# Patient Record
Sex: FEMALE | Race: WHITE | Employment: FULL TIME | URBAN - METROPOLITAN AREA
[De-identification: names, ages, dates, MRNs, and addresses within clinical notes are randomized per-mention and may not be internally consistent; named-entity substitution may affect disease eponyms.]

---

## 2019-08-13 ENCOUNTER — APPOINTMENT (OUTPATIENT)
Dept: GENERAL RADIOLOGY | Age: 64
End: 2019-08-13
Attending: EMERGENCY MEDICINE
Payer: SELF-PAY

## 2019-08-13 ENCOUNTER — HOSPITAL ENCOUNTER (EMERGENCY)
Age: 64
Discharge: HOME | End: 2019-08-13
Attending: EMERGENCY MEDICINE
Payer: SELF-PAY

## 2019-08-13 VITALS
DIASTOLIC BLOOD PRESSURE: 85 MMHG | TEMPERATURE: 97.8 F | RESPIRATION RATE: 12 BRPM | BODY MASS INDEX: 26.68 KG/M2 | SYSTOLIC BLOOD PRESSURE: 147 MMHG | HEIGHT: 67 IN | HEART RATE: 89 BPM | WEIGHT: 170 LBS | OXYGEN SATURATION: 100 %

## 2019-08-13 DIAGNOSIS — S62.102A LEFT WRIST FRACTURE, CLOSED, INITIAL ENCOUNTER: Primary | ICD-10-CM

## 2019-08-13 PROCEDURE — 73110 X-RAY EXAM OF WRIST: CPT

## 2019-08-13 PROCEDURE — 99283 EMERGENCY DEPT VISIT LOW MDM: CPT

## 2019-08-13 PROCEDURE — 75810000053 HC SPLINT APPLICATION

## 2019-08-13 RX ORDER — HYDROCODONE BITARTRATE AND ACETAMINOPHEN 5; 325 MG/1; MG/1
1 TABLET ORAL
Qty: 8 TAB | Refills: 0 | Status: SHIPPED | OUTPATIENT
Start: 2019-08-13 | End: 2019-08-16

## 2019-08-13 NOTE — DISCHARGE INSTRUCTIONS
Patient Education        Broken Wrist: Care Instructions  Your Care Instructions    Your wrist can break, or fracture, during sports, a fall, or other accidents. The break may happen when your wrist is hit or is used to protect you in a fall. Fractures can range from a small, hairline crack, to a bone or bones broken into two or more pieces. Your treatment depends on how bad the break is. Your doctor may have put your wrist in a cast or splint. This will help keep your wrist stable until your follow-up appointment. It may take weeks or months for your wrist to heal. You can help it heal with care at home. You heal best when you take good care of yourself. Eat a variety of healthy foods, and don't smoke. Follow-up care is a key part of your treatment and safety. Be sure to make and go to all appointments, and call your doctor if you are having problems. It's also a good idea to know your test results and keep a list of the medicines you take. How can you care for yourself at home? · Put ice or a cold pack on your wrist for 10 to 20 minutes at a time. Try to do this every 1 to 2 hours for the next 3 days (when you are awake). Put a thin cloth between the ice and your cast or splint. Keep your cast or splint dry. · Follow the splint or cast care instructions your doctor gives you. If you have a splint, do not take it off unless your doctor tells you to. Be careful not to put the splint on too tight. · Be safe with medicines. Take pain medicines exactly as directed. ? If the doctor gave you a prescription medicine for pain, take it as prescribed. ? If you are not taking a prescription pain medicine, ask your doctor if you can take an over-the-counter medicine. · Prop up your wrist on pillows when you sit or lie down in the first few days after the injury. Keep your wrist higher than the level of your heart. This will help reduce swelling.   · Move your fingers often to reduce swelling and stiffness, but do not use that hand to grab or carry anything. · Follow instructions for exercises to keep your arm strong. When should you call for help? Call your doctor now or seek immediate medical care if:    · You have new or worse pain.     · Your hand or fingers are cool or pale or change color.     · Your cast or splint feels too tight.     · You have tingling, weakness, or numbness in your hand or fingers.    Watch closely for changes in your health, and be sure to contact your doctor if:    · You do not get better as expected.     · You have problems with your cast or splint. Where can you learn more? Go to http://david-agnes.info/. Enter [id number] in the search box to learn more about \"Broken Wrist: Care Instructions. \"  Current as of: September 20, 2018  Content Version: 12.1  © 3590-1099 [employer]. Care instructions adapted under license by [employer] (which disclaims liability or warranty for this information). If you have questions about a medical condition or this instruction, always ask your healthcare professional. [address] any warranty or liability for your use of this information.

## 2019-08-13 NOTE — ED PROVIDER NOTES
EMERGENCY DEPARTMENT HISTORY AND PHYSICAL EXAM    Date: 8/13/2019  Patient Name: Lemuel Crowder    History of Presenting Illness     Chief Complaint   Patient presents with    Wrist Pain         History Provided By: Patient        Additional History (Context): Lemuel Crowder is a 60 y.o. female with No significant past medical history who presents with left wrist pain after falling on concrete and landing on her left wrist with her arm extended. Denies numbness weakness. Is right-hand dominant. Hurts to move or touch it. PCP: None    Current Outpatient Medications   Medication Sig Dispense Refill    HYDROcodone-acetaminophen (NORCO) 5-325 mg per tablet Take 1 Tab by mouth every six (6) hours as needed for Pain for up to 3 days. Max Daily Amount: 4 Tabs. Take 1 tablet PO every 6 hours as needed for pain control. 8 Tab 0    HYDROcodone-acetaminophen (NORCO) 5-325 mg per tablet Take 1 Tab by mouth every four (4) hours as needed for Pain. Max Daily Amount: 6 Tabs. 6 Tab 0    ergocalciferol (ERGOCALCIFEROL) 50,000 unit capsule Take 1 Cap by mouth every Sunday. 4 Cap 4    mometasone (NASONEX) 50 mcg/Actuation nasal spray 2 Sprays by Nasal route daily. 1 Container 2    cyanocobalamin (NEURO B-12 FORTE S) 1,000 mcg/mL injection 1,000 mcg by IntraMUSCular route every thirty (30) days.  calcium-vitamin D (OYSTER SHELL) 500 mg(1,250mg) -200 unit per tablet Take 1 Tab by mouth two (2) times daily (with meals).  multivitamin, stress formula (STRESS TAB) tablet Take 1 Tab by mouth daily.  alendronate (FOSAMAX) 70 mg tablet Take 70 mg by mouth Every Saturday.          Past History     Past Medical History:  Past Medical History:   Diagnosis Date    DJD (degenerative joint disease) 2/10/2010    DVT (deep venous thrombosis) ([facility] [address] 2/10/2010    Environmental allergies 2/10/2010    HNP (herniated nucleus pulposus), cervical 2/10/2010    Neuropathy 2/10/2010    Osteopenia 2/10/2010    Pernicious anemia 2/10/2010    Vitamin D deficiency 2/10/2010       Past Surgical History:  Past Surgical History:   Procedure Laterality Date    HX CHOLECYSTECTOMY      gastric bypass 2001    HX UROLOGICAL      kidney stone removal       Family History:  Family History   Problem Relation Age of Onset    Hypertension Mother     Cancer Mother         breast    Breast Cancer Mother 68       Social History:  Social History     Tobacco Use    Smoking status: Never Smoker   Substance Use Topics    Alcohol use: No    Drug use: No       Allergies: Allergies   Allergen Reactions    Codeine Nausea and Vomiting         Review of Systems   Review of Systems   Musculoskeletal: Positive for arthralgias and joint swelling. Skin: Negative for wound. Neurological: Negative for weakness and numbness. All Other Systems Negative  Physical Exam     Vitals:    08/13/19 1757   BP: 147/85   Pulse: 89   Resp: 12   Temp: 97.8 °F (36.6 °C)   SpO2: 100%   Weight: 77.1 kg (170 lb)   Height: 5' 7\" (1.702 m)     Physical Exam   Constitutional: She is oriented to person, place, and time. She appears well-developed. HENT:   Head: Normocephalic and atraumatic. Eyes: Pupils are equal, round, and reactive to light. Neck: No JVD present. No tracheal deviation present. No thyromegaly present. Cardiovascular: Normal rate, regular rhythm and normal heart sounds. Exam reveals no gallop and no friction rub. No murmur heard. Pulmonary/Chest: Effort normal and breath sounds normal. No stridor. No respiratory distress. She has no wheezes. She has no rales. She exhibits no tenderness. Abdominal: Soft. She exhibits no distension and no mass. There is no tenderness. There is no rebound and no guarding. Musculoskeletal: She exhibits tenderness. She exhibits no edema. Left wrist: Obvious deformity to the left volar wrist.  Tender to touch. Radial pulse palpable. All digits with intact range of motion including digit the minimi.   Sensation intact throughout. No othe tenderness in the forearm elbow or hand. Lymphadenopathy:     She has no cervical adenopathy. Neurological: She is alert and oriented to person, place, and time. Skin: Skin is warm and dry. No rash noted. No erythema. No pallor. Psychiatric: She has a normal mood and affect. Her behavior is normal. Thought content normal.   Nursing note and vitals reviewed. Diagnostic Study Results     Labs -   No results found for this or any previous visit (from the past 12 hour(s)). Radiologic Studies -   XR WRIST LT AP/LAT/OBL MIN 3V    (Results Pending)     CT Results  (Last 48 hours)    None        CXR Results  (Last 48 hours)    None            Medical Decision Making   I am the first provider for this patient. I reviewed the vital signs, available nursing notes, past medical history, past surgical history, family history and social history. Vital Signs-Reviewed the patient's vital signs. Procedures:  Procedures    Provider Notes (Medical Decision Making): X-ray shows distal radial fracture will apply volar. Refer to Ortho and treat pain. Splint and sling applied by tech to left wrist; excellent position. N/v intact before and after application. MED RECONCILIATION:  No current facility-administered medications for this encounter. Current Outpatient Medications   Medication Sig    HYDROcodone-acetaminophen (NORCO) 5-325 mg per tablet Take 1 Tab by mouth every six (6) hours as needed for Pain for up to 3 days. Max Daily Amount: 4 Tabs. Take 1 tablet PO every 6 hours as needed for pain control.  HYDROcodone-acetaminophen (NORCO) 5-325 mg per tablet Take 1 Tab by mouth every four (4) hours as needed for Pain. Max Daily Amount: 6 Tabs.  ergocalciferol (ERGOCALCIFEROL) 50,000 unit capsule Take 1 Cap by mouth every Sunday.  mometasone (NASONEX) 50 mcg/Actuation nasal spray 2 Sprays by Nasal route daily.     cyanocobalamin (NEURO B-12 FORTE S) 1,000 mcg/mL injection 1,000 mcg by IntraMUSCular route every thirty (30) days.  calcium-vitamin D (OYSTER SHELL) 500 mg(1,250mg) -200 unit per tablet Take 1 Tab by mouth two (2) times daily (with meals).  multivitamin, stress formula (STRESS TAB) tablet Take 1 Tab by mouth daily.  alendronate (FOSAMAX) 70 mg tablet Take 70 mg by mouth Every Saturday. Disposition:  home    DISCHARGE NOTE:   6:57 PM    Pt has been reexamined. Patient has no new complaints, changes, or physical findings. Care plan outlined and precautions discussed. Results of x-rays were reviewed with the patient. All medications were reviewed with the patient; will d/c home with vicodin. All of pt's questions and concerns were addressed. Patient was instructed and agrees to follow up with ortho, as well as to return to the ED upon further deterioration. Patient is ready to go home. Follow-up Information     Follow up With Specialties Details Why Contact Info    Callie Beltran MD Orthopedic Surgery Schedule an appointment as soon as possible for a visit in 1 day ask for an appointment with Dr. Mónica Valentin [address] [place] [address] [phone]      [facility] EMERGENCY DEPT Emergency Medicine  If symptoms worsen return immediately [address] [place] [id number]  [phone]          Current Discharge Medication List      START taking these medications    Details   !! HYDROcodone-acetaminophen (NORCO) 5-325 mg per tablet Take 1 Tab by mouth every six (6) hours as needed for Pain for up to 3 days. Max Daily Amount: 4 Tabs. Take 1 tablet PO every 6 hours as needed for pain control. Qty: 8 Tab, Refills: 0    Associated Diagnoses: Left wrist fracture, closed, initial encounter       !! - Potential duplicate medications found. Please discuss with provider.       CONTINUE these medications which have NOT CHANGED    Details   !! HYDROcodone-acetaminophen (NORCO) 5-325 mg per tablet Take 1 Tab by mouth every four (4) hours as needed for Pain. Max Daily Amount: 6 Tabs. Qty: 6 Tab, Refills: 0       !! - Potential duplicate medications found. Please discuss with provider. Diagnosis     Clinical Impression:   1.  Left wrist fracture, closed, initial encounter

## 2019-08-19 ENCOUNTER — OFFICE VISIT (OUTPATIENT)
Dept: ORTHOPEDIC SURGERY | Age: 64
End: 2019-08-19

## 2019-08-19 VITALS
BODY MASS INDEX: 26.68 KG/M2 | WEIGHT: 170 LBS | HEART RATE: 79 BPM | HEIGHT: 67 IN | SYSTOLIC BLOOD PRESSURE: 137 MMHG | TEMPERATURE: 96.6 F | DIASTOLIC BLOOD PRESSURE: 71 MMHG | OXYGEN SATURATION: 100 %

## 2019-08-19 DIAGNOSIS — M25.532 LEFT WRIST PAIN: Primary | ICD-10-CM

## 2019-08-19 NOTE — PROGRESS NOTES
Arlette Main is a 60 y.o. female right handed . Worker's Compensation and legal considerations: not known. Vitals:    08/19/19 0956   BP: 137/71   Pulse: 79   Temp: 96.6 °F (35.9 °C)   TempSrc: Oral   SpO2: 100%   Weight: 170 lb (77.1 kg)   Height: 5' 7\" (1.702 m)   PainSc:   4   PainLoc: Wrist           Chief Complaint   Patient presents with    Wrist Pain     left         HPI: Patient comes in today after sustaining a fall approximately a week ago and being diagnosed with a left distal radius fracture. She has been in a splint since that time. Date of onset: August 13, 2019    Injury: Yes: Comment: Fall    Prior Treatment:  Yes: Comment: Emergency department splint    Numbness/ Tingling: No    ROS: Review of Systems - General ROS: negative  Respiratory ROS: no cough, shortness of breath, or wheezing  Cardiovascular ROS: no chest pain or dyspnea on exertion  Musculoskeletal ROS: positive for - pain in wrist - left  Neurological ROS: negative  Dermatological ROS: negative    Past Medical History:   Diagnosis Date    DJD (degenerative joint disease) 2/10/2010    DVT (deep venous thrombosis) ([facility] [address] 2/10/2010    Environmental allergies 2/10/2010    HNP (herniated nucleus pulposus), cervical 2/10/2010    Neuropathy 2/10/2010    Osteopenia 2/10/2010    Pernicious anemia 2/10/2010    Vitamin D deficiency 2/10/2010       Past Surgical History:   Procedure Laterality Date    HX CHOLECYSTECTOMY      gastric bypass 2001    HX UROLOGICAL      kidney stone removal       Current Outpatient Medications   Medication Sig Dispense Refill    HYDROcodone-acetaminophen (NORCO) 5-325 mg per tablet Take 1 Tab by mouth every four (4) hours as needed for Pain. Max Daily Amount: 6 Tabs. 6 Tab 0    ergocalciferol (ERGOCALCIFEROL) 50,000 unit capsule Take 1 Cap by mouth every Sunday. 4 Cap 4    mometasone (NASONEX) 50 mcg/Actuation nasal spray 2 Sprays by Nasal route daily.  1 Container 2    cyanocobalamin (NEURO B-12 FORTE S) 1,000 mcg/mL injection 1,000 mcg by IntraMUSCular route every thirty (30) days.  calcium-vitamin D (OYSTER SHELL) 500 mg(1,250mg) -200 unit per tablet Take 1 Tab by mouth two (2) times daily (with meals).  multivitamin, stress formula (STRESS TAB) tablet Take 1 Tab by mouth daily.  alendronate (FOSAMAX) 70 mg tablet Take 70 mg by mouth Every Saturday. Allergies   Allergen Reactions    Codeine Nausea and Vomiting         PE:     Left Wrist: There is mild tenderness to palpation over the dorsum of the Wrist however there is no gross deformity. Sensation is intact distally and cap refill is brisk. There is mild edema over the dorsum of the joints. Imaging:     Plain films from the emergency department of the left wrist shows a radial styloid fracture with maintained volar tilt and radial height however inclination is lost.    Plain films done today in the cast shows provement of radial inclination as well as height. Overall volar tilt is also maintained. ICD-10-CM ICD-9-CM    1. Left wrist pain M25.532 719.43 AMB POC XRAY, WRIST; COMPLETE, 3+ VIE       Plan:     Closed reduction and casting of the left wrist.    Follow-up in 2 weeks for reevaluation and x-rays. I instructed the patient she will likely wear the cast for 4 to 6 weeks. Plan was reviewed with patient, who verbalized agreement and understanding of the plan    Procedure:      After local anesthetic was used in the form of 1% 10 cc of lidocaine into the wrist, the patient was closed reduced and casted in the form of a short arm cast.  She tolerated the procedure well with minimal pain. I have instructed her to call with an increase in pain swelling or any pain out of proportion. I have also instructed her to work on range of motion exercises of the fingers.

## 2019-08-23 ENCOUNTER — TELEPHONE (OUTPATIENT)
Dept: ORTHOPEDIC SURGERY | Age: 64
End: 2019-08-23

## 2019-08-23 NOTE — TELEPHONE ENCOUNTER
Patient is asking for Dr. Rai Perrin to prescribe her a pain medication. She states the Ibuprofen is not helping. Please advise and pend new Rx if appropriate.     Last visit:  8/19/19 with MD Rai Perrin  Next appt:  9/4/19 with MD Rai Perrin    Patient's work # [phone]

## 2019-09-04 ENCOUNTER — OFFICE VISIT (OUTPATIENT)
Dept: ORTHOPEDIC SURGERY | Facility: CLINIC | Age: 64
End: 2019-09-04

## 2019-09-04 VITALS
HEART RATE: 89 BPM | BODY MASS INDEX: 27 KG/M2 | WEIGHT: 172 LBS | DIASTOLIC BLOOD PRESSURE: 62 MMHG | SYSTOLIC BLOOD PRESSURE: 127 MMHG | HEIGHT: 67 IN | RESPIRATION RATE: 16 BRPM | TEMPERATURE: 97.5 F | OXYGEN SATURATION: 100 %

## 2019-09-04 DIAGNOSIS — S52.532D CLOSED COLLES' FRACTURE OF LEFT RADIUS WITH ROUTINE HEALING, SUBSEQUENT ENCOUNTER: Primary | ICD-10-CM

## 2019-09-04 DIAGNOSIS — M25.532 LEFT WRIST PAIN: ICD-10-CM

## 2019-09-04 RX ORDER — TRAMADOL HYDROCHLORIDE 50 MG/1
50 TABLET ORAL
COMMUNITY

## 2019-09-04 NOTE — PROGRESS NOTES
1. Have you been to the ER, urgent care clinic since your last visit? Hospitalized since your last visit? NO    2. Have you seen or consulted any other health care providers outside of the [address] [place] [address] since your last visit? Include any pap smears or colon screening.  NO

## 2019-09-04 NOTE — PROGRESS NOTES
Saran Jesus is a 60 y.o. female right handed . Worker's Compensation and legal considerations: not known. Vitals:    09/04/19 0800   BP: 127/62   Pulse: 89   Resp: 16   Temp: 97.5 °F (36.4 °C)   TempSrc: Oral   SpO2: 100%   Weight: 172 lb (78 kg)   Height: 5' 7\" (1.702 m)   PainSc:   6           Chief Complaint   Patient presents with    Wrist Pain     left wrist pain, f/u appt. HPI: Patient comes in today for follow-up approximately 2 weeks after being placed into a short arm cast for a left distal radius fracture. She reports some intermittent pain the more active she is. Otherwise her pain is controlled. Initial HPI: Patient comes in today after sustaining a fall approximately a week ago and being diagnosed with a left distal radius fracture. She has been in a splint since that time. Date of onset: August 13, 2019    Injury: Yes: Comment: Fall    Prior Treatment:  Yes: Comment: Emergency department splint    Numbness/ Tingling: No    ROS: Review of Systems - General ROS: negative  Respiratory ROS: no cough, shortness of breath, or wheezing  Cardiovascular ROS: no chest pain or dyspnea on exertion  Musculoskeletal ROS: positive for - pain in wrist - left  Neurological ROS: negative  Dermatological ROS: negative    Past Medical History:   Diagnosis Date    DJD (degenerative joint disease) 2/10/2010    DVT (deep venous thrombosis) ([facility] [address] 2/10/2010    Environmental allergies 2/10/2010    HNP (herniated nucleus pulposus), cervical 2/10/2010    Neuropathy 2/10/2010    Osteopenia 2/10/2010    Pernicious anemia 2/10/2010    Vitamin D deficiency 2/10/2010       Past Surgical History:   Procedure Laterality Date    HX CHOLECYSTECTOMY      gastric bypass 2001    HX UROLOGICAL      kidney stone removal       Current Outpatient Medications   Medication Sig Dispense Refill    traMADol (ULTRAM) 50 mg tablet Take 50 mg by mouth every six (6) hours as needed for Pain.       ergocalciferol (ERGOCALCIFEROL) 50,000 unit capsule Take 1 Cap by mouth every Sunday. 4 Cap 4    mometasone (NASONEX) 50 mcg/Actuation nasal spray 2 Sprays by Nasal route daily. 1 Container 2    cyanocobalamin (NEURO B-12 FORTE S) 1,000 mcg/mL injection 1,000 mcg by IntraMUSCular route every thirty (30) days.  calcium-vitamin D (OYSTER SHELL) 500 mg(1,250mg) -200 unit per tablet Take 1 Tab by mouth two (2) times daily (with meals).  multivitamin, stress formula (STRESS TAB) tablet Take 1 Tab by mouth daily.  alendronate (FOSAMAX) 70 mg tablet Take 70 mg by mouth Every Saturday.  HYDROcodone-acetaminophen (NORCO) 5-325 mg per tablet Take 1 Tab by mouth every four (4) hours as needed for Pain. Max Daily Amount: 6 Tabs. 6 Tab 0       Allergies   Allergen Reactions    Codeine Nausea and Vomiting         PE:     Left Wrist: Cast is clean dry and intact with no evidence of breakdown. Sensation is intact distally and cap refill is brisk. Imaging:     Plain films of the left wrist does not show any interval displacement compared to previous films. There is some loss of inclination however radial height and volar tilt are maintained. ICD-10-CM ICD-9-CM    1. Closed Colles' fracture of left radius with routine healing, subsequent encounter S52.532D V54.12    2. Left wrist pain M25.532 719.43 AMB POC XRAY, WRIST; COMPLETE, 3+ VIE       Plan: We will keep patient in the cast for another 4 weeks. Follow-up in 4 weeks for reevaluation and x-rays.     Plan was reviewed with patient, who verbalized agreement and understanding of the plan

## 2019-10-02 ENCOUNTER — OFFICE VISIT (OUTPATIENT)
Dept: ORTHOPEDIC SURGERY | Facility: CLINIC | Age: 64
End: 2019-10-02

## 2019-10-02 VITALS
OXYGEN SATURATION: 98 % | TEMPERATURE: 96.6 F | HEART RATE: 87 BPM | SYSTOLIC BLOOD PRESSURE: 128 MMHG | BODY MASS INDEX: 26.84 KG/M2 | WEIGHT: 171 LBS | DIASTOLIC BLOOD PRESSURE: 67 MMHG | HEIGHT: 67 IN | RESPIRATION RATE: 16 BRPM

## 2019-10-02 DIAGNOSIS — S52.532D CLOSED COLLES' FRACTURE OF LEFT RADIUS WITH ROUTINE HEALING, SUBSEQUENT ENCOUNTER: Primary | ICD-10-CM

## 2019-10-02 RX ORDER — TRAMADOL HYDROCHLORIDE 50 MG/1
50 TABLET ORAL
Qty: 21 TAB | Refills: 0 | Status: SHIPPED | OUTPATIENT
Start: 2019-10-02 | End: 2019-10-09

## 2019-10-02 NOTE — PROGRESS NOTES
1. Have you been to the ER, urgent care clinic since your last visit? Hospitalized since your last visit? Yes, [facility] ED    2. Have you seen or consulted any other health care providers outside of the [address] [place] [address] since your last visit? Include any pap smears or colon screening.    NO

## 2019-10-02 NOTE — PROGRESS NOTES
Lisa Kirk is a 60 y.o. female right handed . Worker's Compensation and legal considerations: not known. Vitals:    10/02/19 0824   BP: 128/67   Pulse: 87   Resp: 16   Temp: 96.6 °F (35.9 °C)   TempSrc: Oral   SpO2: 98%   Weight: 171 lb (77.6 kg)   Height: 5' 7\" (1.702 m)   PainSc:   3           Chief Complaint   Patient presents with    Wrist Pain     left wrist pain, f/u appt. HPI:  Pt comes in for follow-up 6 weeks s/p Left Distal Radius Fracture. She has been in a cast for that entire time. She reports continued pain and stiffness in the wrist joint. She denies any injuries or falls since last visit    Previous HPI: Patient comes in today for follow-up approximately 2 weeks after being placed into a short arm cast for a left distal radius fracture. She reports some intermittent pain the more active she is. Otherwise her pain is controlled. Initial HPI: Patient comes in today after sustaining a fall approximately a week ago and being diagnosed with a left distal radius fracture. She has been in a splint since that time.     Date of onset: August 13, 2019    Injury: Yes: Comment: Fall    Prior Treatment:  Yes: Comment: Emergency department splint    Numbness/ Tingling: No    ROS: Review of Systems - General ROS: negative  Respiratory ROS: no cough, shortness of breath, or wheezing  Cardiovascular ROS: no chest pain or dyspnea on exertion  Musculoskeletal ROS: positive for - pain in wrist - left  Neurological ROS: negative  Dermatological ROS: negative    Past Medical History:   Diagnosis Date    DJD (degenerative joint disease) 2/10/2010    DVT (deep venous thrombosis) ([facility] [address] 2/10/2010    Environmental allergies 2/10/2010    HNP (herniated nucleus pulposus), cervical 2/10/2010    Neuropathy 2/10/2010    Osteopenia 2/10/2010    Pernicious anemia 2/10/2010    Vitamin D deficiency 2/10/2010       Past Surgical History:   Procedure Laterality Date    HX CHOLECYSTECTOMY gastric bypass 2001    HX UROLOGICAL      kidney stone removal       Current Outpatient Medications   Medication Sig Dispense Refill    traMADol (ULTRAM) 50 mg tablet Take 1 Tab by mouth every eight (8) hours as needed for Pain for up to 7 days. Max Daily Amount: 150 mg. 21 Tab 0    ergocalciferol (ERGOCALCIFEROL) 50,000 unit capsule Take 1 Cap by mouth every Sunday. 4 Cap 4    mometasone (NASONEX) 50 mcg/Actuation nasal spray 2 Sprays by Nasal route daily. 1 Container 2    cyanocobalamin (NEURO B-12 FORTE S) 1,000 mcg/mL injection 1,000 mcg by IntraMUSCular route every thirty (30) days.  calcium-vitamin D (OYSTER SHELL) 500 mg(1,250mg) -200 unit per tablet Take 1 Tab by mouth two (2) times daily (with meals).  multivitamin, stress formula (STRESS TAB) tablet Take 1 Tab by mouth daily.  alendronate (FOSAMAX) 70 mg tablet Take 70 mg by mouth Every Saturday.  traMADol (ULTRAM) 50 mg tablet Take 50 mg by mouth every six (6) hours as needed for Pain.  HYDROcodone-acetaminophen (NORCO) 5-325 mg per tablet Take 1 Tab by mouth every four (4) hours as needed for Pain. Max Daily Amount: 6 Tabs. 6 Tab 0       Allergies   Allergen Reactions    Codeine Nausea and Vomiting         PE:     Left Wrist: There is tenderness to palpation about the wrist though minimal.  Pt is guarded with any ROM exercises. Sensation intact distally.   Cap refill brisk    Imaging:     Plain films of left wrist show a healed left distal radius fracture with 15 degrees of volar tilt and loss of radial inclination      ICD-10-CM ICD-9-CM    1. Closed Colles' fracture of left radius with routine healing, subsequent encounter S52.532D V54.12 AMB POC XRAY, WRIST; COMPLETE, 3+ VIE      REFERRAL TO OCCUPATIONAL THERAPY      traMADol (ULTRAM) 50 mg tablet      AMB SUPPLY ORDER       Plan:     Wrist Brace to be worn when out of the house    OT for Progressive ROM exercises    F/U in 6 weeks for re-eval and xrays    Plan was reviewed with patient, who verbalized agreement and understanding of the plan

## 2019-10-18 ENCOUNTER — HOSPITAL ENCOUNTER (OUTPATIENT)
Dept: PHYSICAL THERAPY | Age: 64
Discharge: HOME | End: 2019-10-18
Payer: SELF-PAY

## 2019-10-18 PROCEDURE — 97165 OT EVAL LOW COMPLEX 30 MIN: CPT

## 2019-10-18 PROCEDURE — 97018 PARAFFIN BATH THERAPY: CPT

## 2019-10-18 NOTE — PROGRESS NOTES
OT DAILY TREATMENT NOTE 10-18    Patient Name: Lilly Lyn  Date:10/18/2019  : 1955  [x]  Patient  Verified  Payor: SELF PAY / Plan: [facility] SELF PAY / Product Type: Self Pay /    In time:8:15  Out time:9:00  Total Treatment Time (min): 45  Visit #: 1 of 12    Treatment Area: Colles' fracture of left radius, subsequent encounter for closed fracture with routine healing [V55.627B]    SUBJECTIVE  Pain Level (0-10 scale): 2/10  Any medication changes, allergies to medications, adverse drug reactions, diagnosis change, or new procedure performed?: [x] No    [] Yes (see summary sheet for update)  Subjective functional status/changes:   [] No changes reported  Has trouble with putting hair back.     OBJECTIVE    Modality rationale: decrease inflammation and decrease pain to improve the patients ability to Carry large objects   Min Type Additional Details    [] Estim:  []Unatt       []IFC  []Premod                        []Other:  []w/ice   []w/heat  Position:  Location:    [] Estim: []Att    []TENS instruct  []NMES                    []Other:  []w/US   []w/ice   []w/heat  Position:  Location:    []  Traction: [] Cervical       []Lumbar                       [] Prone          []Supine                       []Intermittent   []Continuous Lbs:  [] before manual  [] after manual    []  Ultrasound: []Continuous   [] Pulsed                           []1MHz   []3MHz W/cm2:  Location:    []  Iontophoresis with dexamethasone         Location: [] Take home patch   [] In clinic    []  Ice     []  heat  []  Ice massage  []  Laser   []  Anodyne Position:  Location:   10 min []  Laser with stim  []  Other: paraffin Position:  Location: left hand    []  Vasopneumatic Device Pressure:       [] lo [] med [] hi   Temperature: [] lo [] med [] hi       [x] Skin assessment post-treatment:  [x]intact []redness- no adverse reaction    []redness - adverse reaction:     30 min []Eval                  []Re-Eval       5 min Therapeutic Exercise:  [] See flow sheet :   Rationale: increase ROM and increase strength to improve the patients ability to perform light meal prep    AAROM ulnar/radial deviation left wrist 10 x  AAROM for left wrist flexion/extension 10 x  Active ROM for left wrist flexion/extension 10 x  Radial abduction of left thumb 10 x    With   [] TE   [] TA   [] neuro   [] other: Patient Education: [x] Review HEP    [] Progressed/Changed HEP based on:   [] positioning   [] body mechanics   [] transfers   [] heat/ice application   [] Splint wear/care   [] Sensory re-education   [] scar management      [] other:            Other Objective/Functional Measures:     Subjective: pt is a left hand dominant, 60 y.o.y/o, female who sustained a distal radial fracture to her left wrist on 7/11/2019. Prior level of function: Works in an [employer], [employer] with self care and leisure activities. Enjoys crocheting. Pain level:(0-no pain 10-debilitating pain) intermittent    Description/Location: left wrist with c/o minimal relief   Worst pain10/10 Least pain 0/10   Activities which aggravate pain: Extending wrist   Activities which ease pain: not using left hand  Current functional limitations/living situation: Difficulty with combing hair, showering, and lifting objects     Medical hx: Depression, Arthritis, Visually impaired    Medications: See the written copy of this report in the patient's paper medical record.        Objective:  Range of Motion:     Active Passive     Norms Right Left Right Left   Shoulder Flex 0-180        Ext 0-60        abd 0-180        Horizontal add 0-45        IR 0-70        ER 0-90       Elbow Ext/flex 0-150       Forearm Supination 0-80        Pronation 0-80       Wrist Flex 0-80 59 55      Ext 0-70 78 46      Ulnar Dev 0-30 46 30      Radial Dev 0-20 21 15         Hand ROM (left/right)   Index Middle Ring Small Thumb    MP      CMC   PIP      MP   DIP      IP        45/50 Nguyen Abd        42/31 Radial Abd     Hand Strength:   Gross Grasp 3pt Pinch Lateral Pinch Tip Pinch   Right  43 11 12 10   Left         Nine-Hole Peg Test:  Left= _22____seconds  Right=__22___seconds  Finger Opposition: able to oppose all digits in both hands    ADLs  Feeding:        []MaxA   []ModA   []Fabio   [] CGA   []SBA   []Amaya   [x]Independent  UE Dressing:       []MaxA   []ModA   []Fabio   [] CGA   []SBA   []Amaya   [x]Independent  LE Dressing:       []MaxA   []ModA   []Fabio   [] CGA   []SBA   []Amaya   [x]Independent  Grooming:       []MaxA   []ModA   []Fabio   [] CGA   []SBA   []Amaya   [x]Independent  Toileting:       []MaxA   []ModA   []Fabio   [] CGA   []SBA   [x]Amaya   []Independent  Bathing:       []MaxA   []ModA   []Fabio   [] CGA   []SBA   [x]Amaya   []Independent  Light Meal Prep:    []MaxA   []ModA   [x]Fabio   [] CGA   []SBA   []Amaya   []Independent  Household/Other: []MaxA   []ModA   []Fabio   [] CGA   []SBA   []Amaya   [x]Independent  Adaptive Equip:     []MaxA   []ModA   []Fabio   [] CGA   []SBA   []Amaya   [x]Independent  Driving:       []MaxA   []ModA   []Fabio   [] CGA   []SBA   []Amaya   [x]Independent  Money Mgmt:        []MaxA   []ModA   []Fabio   [] CGA   []SBA   []Amaya   [x]Independent       Pain Level (0-10 scale) post treatment: 2/10    ASSESSMENT/Changes in Function:     [x]  See Plan of Care  []  See progress note/recertification  []  See Discharge Summary         Progress towards goals / Updated goals:    PLAN  []  Upgrade activities as tolerated     []  Continue plan of care  []  Update interventions per flow sheet       []  Discharge due to:_  []  Other:_      IFEOMA Pena 10/18/2019  8:22 AM    Future Appointments   Date Time Provider Renato Ortiz   11/13/2019  8:20 AM DO Natali De Santiago

## 2019-10-18 NOTE — PROGRESS NOTES
[facility]  [address] [place] [address]  [phone] fax    Plan of Care/Statement of Necessity for Occupational Therapy Services    Patient name: Lorena Pruett Start of Care: 10/18/2019   Referral source: Rajan FrancoDO : 1955    Medical Diagnosis: Colles' fracture of left radius, subsequent encounter for closed fracture with routine healing [S52.532D]  Payor: SELF PAY / Plan: [facility] SELF PAY / Product Type: Self Pay /  Onset Date:19    Treatment Diagnosis: pain in left hand   Prior Hospitalization: see medical history Provider#: [id number]   Medications: Verified on Patient summary List    Comorbidities: Arthritis, depression, visually impaired   Prior Level of Function: 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 Enjoys crocheting. The Plan of Care and following information is based on the information from the initial evaluation. Assessment/ key information: Patient is a 61 y/o RHD female with a left distal radial fracture on 19 after falling on an uneven sidewalk. She was casted, and had it removed on 10/2/19. She currently uses a wrist orthotic for support. She c/o pain and swelling, and has difficulty carrying objects heavier than a purse. Her pain at eval is 2/10 and reaches 10/10 with use. Her wrist and hand ROM is shown be the following charts:    Range of Motion    Right        Left  Wrist Flex 0-80 59 55         Ext 0-70 78 46         Ulnar Dev 0-30 46 30         Radial Dev 0-20 21 15         Thumb ROM (left/right)  Thumb     45/50 Nguyen Abd   31/42 Radial Abd      The 9-hole peg test shows fine motor coordination of 22 seconds for right hand and 22 seconds for left hand. She reports difficulty with pulling and cutting large objects at work. She reports difficulty with carrying groceries and household cleanup activities. Her FOTO score is 53 reflecting a moderate impairment in function. Patient would benefit from skilled occupational therapy to improve ROM, strength, and decrease pain in left wrist.    Evaluation Complexity: History LOW Complexity : Brief history review  Examination LOW Complexity : 1-3 performance deficits relating to physical, cognitive , or psychosocial skils that result in activity limitations and / or participation restrictions  Clinical Decision Making LOW Complexity : No comorbidities that affect functional and no verbal or physical assistance needed to complete eval tasks   Overall Complexity Rating: LOW     Problem List: Pain effecting function, Decreased range of motion and Decreased strength     Treatment Plan may include any combination of the following: Therapeutic exercise, Therapeutic activities, Physical agent/modality, Manual therapy, Patient education and ADLs/IADLs    Patient / Family readiness to learn indicated by: asking questions, trying to perform skills and interest    Persons(s) to be included in education:   patient (P)    Barriers to Learning/Limitations: None    Patient Goal (s): More movement in wrist and less pain     Patient Self Reported Health Status: good    Rehabilitation Potential: good    Short Term Goals: To be accomplished in 2 weeks:  1. Patient will be independent with HEP for wrist ROM for brushing teeth  2. Patient will be familiar with activity modifications to reduce pain in left wrist  3. Patient will be independent with HEP for hand strengthening for carrying groceries  Long Term Goals: To be accomplished in 4 weeks:   1. Patient will increase extension of left wrist by 20 degrees for weight bearing on left wrist  2. Patient will report pain of 0-4/10 while performing household tasks  3. Patient will have a  to at least 25 pounds on left hand for carrying large objects at work  3.  Patient will increase ability to perform functional tasks at work as shown by an improved FOTO score of at least 67    Frequency / Duration: Patient to be seen 2-3 times per week for 4 weeks:    Patient/ Caregiver education and instruction: Diagnosis, prognosis, self care, activity modification, brace/ splint application and exercises   [x]  Plan of care has been reviewed with COTA Dianne Dakins OTS 10/18/2019 9:59 AM    _____________________________________________________________________    I certify that the above Therapy Services are being furnished while the patient is under my care. I agree with the treatment plan and certify that this therapy is necessary.     Physician's Signature:____________Date:_________TIME:________    ** Signature, Date and Time must be completed for valid certification **    Please sign and return to [facility]   [address] [place] [address]  [phone] fax

## 2019-10-22 ENCOUNTER — HOSPITAL ENCOUNTER (OUTPATIENT)
Dept: PHYSICAL THERAPY | Age: 64
Discharge: HOME | End: 2019-10-22
Payer: SELF-PAY

## 2019-10-22 PROCEDURE — 97110 THERAPEUTIC EXERCISES: CPT

## 2019-10-22 PROCEDURE — 97018 PARAFFIN BATH THERAPY: CPT

## 2019-10-25 ENCOUNTER — HOSPITAL ENCOUNTER (OUTPATIENT)
Dept: PHYSICAL THERAPY | Age: 64
Discharge: HOME | End: 2019-10-25
Payer: SELF-PAY

## 2019-10-25 PROCEDURE — 97018 PARAFFIN BATH THERAPY: CPT

## 2019-10-25 PROCEDURE — 97530 THERAPEUTIC ACTIVITIES: CPT

## 2019-10-25 PROCEDURE — 97110 THERAPEUTIC EXERCISES: CPT

## 2019-10-25 NOTE — PROGRESS NOTES
OT DAILY TREATMENT NOTE 10-18    Patient Name: Landon Ramon  Date:10/25/2019  : 1955  [x]  Patient  Verified  Payor: SELF PAY / Plan: [facility]F PAY / Product Type: Self Pay /    In time:731  Out time:810  Total Treatment Time (min): 39  Visit #: 3 of 12    Treatment Area: Colles' fracture of left radius, subsequent encounter for closed fracture with routine healing [T55.661E]    SUBJECTIVE  Pain Level (0-10 scale): 4/10  Any medication changes, allergies to medications, adverse drug reactions, diagnosis change, or new procedure performed?: [x] No    [] Yes (see summary sheet for update)  Subjective functional status/changes:   [] No changes reported  Patient reported moving wrist wrong yesterday which caused increase in pain.      OBJECTIVE    Modality rationale: decrease inflammation and decrease pain to improve the patients ability to stretch canvas at work   [email or web address] Type Additional Details      [] Estim:  []Unatt       []IFC  []Premod                        []Other:  []w/ice   []w/heat  Position:  Location:      [] Estim: []Att    []TENS instruct  []NMES                    []Other:  []w/US   []w/ice   []w/heat  Position:  Location:      []  Traction: [] Cervical       []Lumbar                       [] Prone          []Supine                       []Intermittent   []Continuous Lbs:  [] before manual  [] after manual      []  Ultrasound: []Continuous   [] Pulsed                           []1MHz   []3MHz W/cm2:  Location:      []  Iontophoresis with dexamethasone         Location: [] Take home patch   [] In clinic      []  Ice     []  heat  []  Ice massage  []  Laser   []  Anodyne Position:  Location:    10 min []  Laser with stim  [x]  Other: paraffin Position:  Location: lefet hand      []  Vasopneumatic Device Pressure:       [] lo [] med [] hi   Temperature: [] lo [] med [] hi       [x] Skin assessment post-treatment:  [x]intact []redness- no adverse reaction    []redness - adverse reaction:      21 min Therapeutic Exercise:  [] See flow sheet :   Rationale: increase ROM and increase strength to improve the patients ability to freely move wrist, grasp     Wrist Flexion/Extension  Supination/Pronation  UD/RD  Radial Thumb Abduction  Wrist Mazes 5x  Soft Theraputty: 10/10    8 min Therapeutic Activity:  []  See flow sheet :   Rationale: Joint Protection  to improve the patients ability to perform work tasks     Joint Protection Handout review       With   [] TE   [x] TA   [] neuro   [] other: Patient Education: [x] Review HEP    [] Progressed/Changed HEP based on:   [x] positioning   [] body mechanics   [] transfers   [] heat/ice application   [] Splint wear/care   [] Sensory re-education   [] scar management      [] other:            Other Objective/Functional Measures:      Able to tolerate wrist mazes with no c/o pain        Pain Level (0-10 scale) post treatment: 2/10    ASSESSMENT/Changes in Function: ROM improving     Patient will continue to benefit from skilled OT services to modify and progress therapeutic interventions, address ROM deficits, address strength deficits and instruct in home and community integration to attain remaining goals. []  See Plan of Care  []  See progress note/recertification  []  See Discharge Summary         Progress towards goals / Updated goals:  Short term goals:  1. Patient will be independent with HEP for wrist ROM for brushing teeth  Status last visit: HEP review 10/22/19    2. Patient will be familiar with activity modifications to reduce pain in left wrist  Status last visit: Joint Protection HEP administered on this date 10/25/19    3. Patient will be independent with HEP for hand strengthening for carrying groceries  Status last visit: HEP review 10/22/19    Long Term Goals: To be accomplished in 4 weeks:              1.  Patient will increase extension of left wrist by 20 degrees for weight bearing on left wrist  Status last visit: Progressing with in-clinic activities 10/22/19     2. Patient will report pain of 0-4/10 while performing household tasks  Status last visit:  Progressing per patient report 10/25/19    3. Patient will have a  to at least 25 pounds on left hand for carrying large objects at work  Status last visit: Progressing with hand helper 10/22/19    4.  Patient will increase ability to perform functional tasks at [employer] an improved FOTO score of at [address]  []  Upgrade activities as tolerated     [x]  Continue plan of care  []  Update interventions per flow sheet       []  Discharge due to:_  []  Other:_      NISHA Mathis/L 10/25/2019  7:42 AM    Future Appointments   Date Time Provider Renato Ortiz   10/29/2019  7:30 AM Rodo Stevenson OTR/L [id number] [facility]   11/1/2019  7:30 AM Betty Javier NODBIUA [facility]   11/5/2019  7:30 AM Rodo Stevenson OTR/L MMCPTPB [facility]   11/8/2019  7:30 AM Bettybaldomero Herrera MZNKGOT [facility]   11/12/2019  7:30 AM Rodo Stevenson OTR/L MMCPTPB [facility]   11/15/2019  7:30 AM Betty Jarretta KCTTJYC [facility]   11/20/2019  9:10 AM Sachi Ozuna,  [facility] Guido Perez

## 2019-10-29 ENCOUNTER — HOSPITAL ENCOUNTER (OUTPATIENT)
Dept: PHYSICAL THERAPY | Age: 64
Discharge: HOME | End: 2019-10-29
Payer: SELF-PAY

## 2019-10-29 PROCEDURE — 97110 THERAPEUTIC EXERCISES: CPT
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
## 2019-11-01 ENCOUNTER — HOSPITAL ENCOUNTER (OUTPATIENT)
Dept: PHYSICAL THERAPY | Age: 64
Discharge: HOME | End: 2019-11-01
Payer: SELF-PAY

## 2019-11-01 PROCEDURE — 97110 THERAPEUTIC EXERCISES: CPT

## 2019-11-01 PROCEDURE — 97018 PARAFFIN BATH THERAPY: CPT

## 2019-11-01 NOTE — PROGRESS NOTES
OT DAILY TREATMENT NOTE 10-18    Patient Name: Rachel Miranda  Date:2019  : 1955  [x]  Patient  Verified  Pay[facility]: SELF PA[facility] / Product Type: Self Pay /    In time:7:30  Out time:8:15  Total Treatment Time (min): 45  Visit #: 5 of 12    Treatment Area: Colles' fracture of left radius, subsequent encounter for closed fracture with routine healing [U33.416Z]    SUBJECTIVE  Pain Level (0-10 scale): 0/10  Any medication changes, allergies to medications, adverse drug reactions, diagnosis change, or new procedure performed?: [x] No    [] Yes (see summary sheet for update)  Subjective functional status/changes:   [] No changes reported  Stopped wearing her wrist brace this week    OBJECTIVE    Modality rationale: decrease pain to improve the patients ability to grasp   Min Type Additional Details    [] Estim:  []Unatt       []IFC  []Premod                        []Other:  []w/ice   []w/heat  Position:  Location:    [] Estim: []Att    []TENS instruct  []NMES                    []Other:  []w/US   []w/ice   []w/heat  Position:  Location:    []  Traction: [] Cervical       []Lumbar                       [] Prone          []Supine                       []Intermittent   []Continuous Lbs:  [] before manual  [] after manual    []  Ultrasound: []Continuous   [] Pulsed                           []1MHz   []3MHz W/cm2:  Location:    []  Iontophoresis with dexamethasone         Location: [] Take home patch   [] In clinic    []  Ice     []  heat  []  Ice massage  []  Laser   []  Anodyne Position:  Location:   10 min []  Laser with stim  [x]  Other: paraffin Position:  Location: left wrist    []  Vasopneumatic Device Pressure:       [] lo [] med [] hi   Temperature: [] lo [] med [] hi       [x] Skin assessment post-treatment:  [x]intact []redness- no adverse reaction    []redness - adverse reaction:       35 min Therapeutic Exercise:  [] See flow sheet :   Rationale: increase ROM and increase strength to improve the patients ability to freely move wrist    Left wrist passive flexion/extension 10 x  Left wrist active UD/RD 10 x  Wrist mazes left hand 5 x  Soft theraputty left hand 10/10  4-band hand helper left hand, 27 x  Therabar yellow 10 x all ways    With   [] TE   [] TA   [] neuro   [] other: Patient Education: [x] Review HEP    [] Progressed/Changed HEP based on:   [] positioning   [] body mechanics   [] transfers   [] heat/ice application   [] Splint wear/care   [] Sensory re-education   [] scar management      [x] other: Review wrist brace daily use, as per doctor            Other Objective/Functional Measures:     no pain during wrist mazes     Pain Level (0-10 scale) post treatment: 0/10    ASSESSMENT/Changes in Function: Pain decreasing    Patient will continue to benefit from skilled OT services to modify and progress therapeutic interventions, address ROM deficits, address strength deficits, analyze and cue movement patterns and instruct in home and community integration to attain remaining goals. [x]  See Plan of Care  []  See progress note/recertification  []  See Discharge Summary         Progress towards goals / Updated goals:  Short term goals:  1. Patient will be independent with HEP for wrist ROM for brushing teeth  Status last visit: HEP review 10/22/19 Met 10/29/19     2. Patient will be familiar with activity modifications to reduce pain in left wrist  Status last visit: Discussed using brace every day per MD. Met 10/29/19      3. Patient will be independent with HEP for hand strengthening for carrying groceries  Status last visit: HEP review 10/22/19 Met 10/29/19     Long Term Goals: To be accomplished in 4 weeks:              1. Patient will increase extension of left wrist by 20 degrees for weight bearing on left wrist  Status last visit: Progressing with in clinic ROM activities 11/1/19     2.  Patient will report pain of 0-4/10 while performing household tasks  Status last visit: Pain reported at 0/10 at start of session 11/1/19     3. Patient will have a  to at least 25 pounds on left hand for carrying large objects at work  Status last visit: Progressing with therabar 11/1/19     4.  Patient will increase ability to perform functional tasks at [employer] an improved FOTO score of at [address]  []  Upgrade activities as tolerated     [x]  Continue plan of care  []  Update interventions per flow sheet       []  Discharge due to:_  []  Other:_      IFEOMA Saravia 11/1/2019  7:36 AM    Future Appointments   Date Time Provider Renato Ortiz   11/5/2019  7:30 AM Rodo Stevenson, OTR/L [facility]   11/8/2019  7:30 AM Betty Herrera [facility]   11/12/2019  7:30 AM Rodo Stevenson, OTR/L [facility]   11/15/2019  7:30 AM Rodo Stevenson, OTR/L [facility]   11/20/2019  9:10 AM Vania Jones DO [facility]

## 2019-11-05 ENCOUNTER — HOSPITAL ENCOUNTER (OUTPATIENT)
Dept: PHYSICAL THERAPY | Age: 64
Discharge: HOME | End: 2019-11-05
Payer: SELF-PAY

## 2019-11-05 PROCEDURE — 97110 THERAPEUTIC EXERCISES: CPT

## 2019-11-05 PROCEDURE — 97018 PARAFFIN BATH THERAPY: CPT

## 2019-11-05 NOTE — PROGRESS NOTES
OT DAILY TREATMENT NOTE 10-18    Patient Name: Bailey Duran  Date:2019  : 1955  [x]  Patient  Verified  Payor: SELF PAY / Plan: [facility] SELF PAY / Product Type: Self Pay /    In time:7:30  Out time:8:15  Total Treatment Time (min): 45  Visit #: 6 of 12    Treatment Area: Colles' fracture of left radius, subsequent encounter for closed fracture with routine healing [Y48.041I]    SUBJECTIVE  Pain Level (0-10 scale): 1/10  Any medication changes, allergies to medications, adverse drug reactions, diagnosis change, or new procedure performed?: [x] No    [] Yes (see summary sheet for update)  Subjective functional status/changes:   [] No changes reported  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  Feeling of joint pressure stopping active extension of wrist.    OBJECTIVE    Modality rationale: decrease pain to improve the patients ability to grasp   Min Type Additional Details    [] Estim:  []Unatt       []IFC  []Premod                        []Other:  []w/ice   []w/heat  Position:  Location:    [] Estim: []Att    []TENS instruct  []NMES                    []Other:  []w/US   []w/ice   []w/heat  Position:  Location:    []  Traction: [] Cervical       []Lumbar                       [] Prone          []Supine                       []Intermittent   []Continuous Lbs:  [] before manual  [] after manual    []  Ultrasound: []Continuous   [] Pulsed                           []1MHz   []3MHz W/cm2:  Location:    []  Iontophoresis with dexamethasone         Location: [] Take home patch   [] In clinic    []  Ice     []  heat  []  Ice massage  []  Laser   []  Anodyne Position:  Location:   10 min []  Laser with stim  [x]  Other: paraffin Position:  Location: left hand    []  Vasopneumatic Device Pressure:       [] lo [] med [] hi   Temperature: [] lo [] med [] hi       [x] Skin assessment post-treatment:  [x]intact []redness- no adverse reaction    []redness - adverse reaction:     35 min Therapeutic Exercise:  [] See flow sheet :   Rationale: increase ROM and increase strength to improve the patients ability to freely move wrist    Left wrist passive flexion/extension 10 x  Left wrist active UD/RD 10 x   Left active forearm pronation/supination using 1# weight 10 x  Soft theraputty left hand 10/10  Therabar yellow 10 x all ways  Left hand passive stretch flex/ext using 1# weight    With   [] TE   [] TA   [] neuro   [] other: Patient Education: [x] Review HEP    [] Progressed/Changed HEP based on:   [] positioning   [] body mechanics   [] transfers   [] heat/ice application   [] Splint wear/care   [] Sensory re-education   [] scar management      [x] other: Discussed overuse of left wrist while at work           Other Objective/Functional Measures:  Pain during active wrist extension     Pain Level (0-10 scale) post treatment: 1.5/10    ASSESSMENT/Changes in Function: Pain increasing with use    Patient will continue to benefit from skilled OT services to modify and progress therapeutic interventions, address ROM deficits, address strength deficits, analyze and cue movement patterns and instruct in home and community integration to attain remaining goals. [x]  See Plan of Care  []  See progress note/recertification  []  See Discharge Summary         Progress towards goals / Updated goals:  Short term goals:  1. Patient will be independent with HEP for wrist ROM for brushing teeth  Status last visit: HEP review 10/22/19 Met 10/29/19     2. Patient will be familiar with activity modifications to reduce pain in left wrist  Status last visit: Discussed using brace every day per MD. Met 10/29/19      3. Patient will be independent with HEP for hand strengthening for carrying groceries  Status last visit: HEP review 10/22/19 Met 10/29/19     Long Term Goals: To be accomplished in 4 weeks:              1.  Patient will increase extension of left wrist by 20 degrees for weight bearing on left wrist  Status last visit: Progressing with in clinic ROM activities 11/5/19     2. Patient will report pain of 0-4/10 while performing household tasks  Status last visit: Pain reported at 3/10 for active wrist extension during in-clinic tasks. Goal met. 11/5/19    3. Patient will have a  to at least 25 pounds on left hand for carrying large objects at work  Status last visit: Progressing with therabar 11/5/19     4.  Patient will increase ability to perform functional tasks at [employer] an improved [facility] score [facility]  []  Upgrade activities as tolerated     [x]  Continue plan of care  []  Update interventions per flow sheet       []  Discharge due to:_  []  Other:_      IFEOMA Duncan 11/5/2019  7:44 AM    Future Appointments   Date Time Provider Renato Ortiz   11/8/2019  7:30 AM Shanell Clemons [facility]   11/12/2019  7:30 AM Chito Mcallister, OTR/L [facility]   11/15/2019  7:30 AM Chito Mcallister, OTR/L [facility]   11/20/2019  9:10 AM Tash Clarke DO [facility]

## 2019-11-08 ENCOUNTER — APPOINTMENT (OUTPATIENT)
Dept: PHYSICAL THERAPY | Age: 64
End: 2019-11-08
Payer: SELF-PAY

## 2019-11-12 ENCOUNTER — HOSPITAL ENCOUNTER (OUTPATIENT)
Dept: PHYSICAL THERAPY | Age: 64
End: 2019-11-12
Payer: SELF-PAY

## 2019-11-15 ENCOUNTER — HOSPITAL ENCOUNTER (OUTPATIENT)
Dept: PHYSICAL THERAPY | Age: 64
Discharge: HOME | End: 2019-11-15
Payer: SELF-PAY

## 2019-11-15 PROCEDURE — 97018 PARAFFIN BATH THERAPY: CPT

## 2019-11-15 PROCEDURE — 97110 THERAPEUTIC EXERCISES: CPT

## 2019-11-15 NOTE — PROGRESS NOTES
[facility]  [address] [place] [address]  [phone] fax    Occupational Therapy Discharge Summary    Patient name: Lisa Kirk Start of Care: 10/18/2019   Referral source: Catarina He  : 1955   Medical/Treatment Diagnosis: Colles' fracture of left radius, subsequent encounter for closed fracture with routine healing [F69.037M]  Payor: SELF PAY / Plan: [facility] SELF PAY / Product Type: Self Pay /  Onset Date:2019     Prior Hospitalization: see medical history Provider#: [id number]   Medications: Verified on Patient Summary List    Comorbidities: Arthritis, depression, visually impaired  Prior Level of Function:Works in [employer] with self care and leisure activities. Enjoys crocheting. Visits from Start of Care: 7    Missed Visits: 0  Reporting Period : 10/18/2019  to 11/15/2019    Summary of Care: Patient seen for decreased left wrist strength, ROM, and increased pain. Therapy included therapeutic exercise, therapeutic activity, and physical modalities. Functional Measures:  (11/15 recheck in bold)                      Right          Left  Wrist Flex 0-80 59 55 60         Ext 0-70 78 46 53         Ulnar Dev 0-30 46 30 30         Radial Dev 0-20 21 15 24          Hand ROM (left/right)  11/15 recheck in bold    Index Middle Ring Small Thumb     MP           CMC   PIP           MP   DIP           IP             45(51)/50  Nguyen Abd             42(56)/31  Radial Abd      Hand Strength: (11/15 recheck in bold)    Gross Grasp 3pt Pinch Lateral Pinch Tip Pinch   Right  43 11 12 10   Left  unable (22) Unable (8)  Unable (10)  Unable (4)           Goal: STG1. Patient will be independent with HEP for wrist ROM for brushing teeth  Status at eval: Unfamiliar  Status at discharge: met 10/29/19    Goal: STG2.  Patient will be familiar with activity modifications to reduce pain in left wrist  Status at eval: Not familiar  Status at discharge: met. Discussed using brace every day per MD. 10/29/19. Goal: STG3. Patient will be independent with HEP for hand strengthening for carrying groceries  Status at [facility]: Unfamiliar  Status at discharge: met 10/29/19    Goal: LTG1. Patient will increase extension of left wrist by 20 degrees for weight bearing on left wrist  Status at [facility]: 46 degrees  Status at discharge: not met, progressing +7 degrees 11/15/19    Goal: LTG2. Patient will report pain of 0-4/10 while performing household tasks  Status at [facility]: Pain reported 10/10 with use  Status at discharge: met. Pain reported at 3/10 for active wrist extension during in-clinic tasks 11/5/19    Goal: LTG3. Patient will have a  to at least 25 pounds on left hand for carrying large objects at work  Status at eval: Unable d/t pain  Status at discharge: not met, progressing.  22lbs 11/15/19    Goal: LTG4. Patient will increase ability to perform functional tasks at [employer] an improved FOTO score of at [facility]  Status at [facility]: 53  Status at discharge: met 68. 11/15/19      ASSESSMENT/Changes in Function: Patient made improvements with left hand strength and left hand/wrist ROM, and decrease in pain.     ASSESSMENT/RECOMMENDATIONS:  [x]Discontinue therapy: [x]Patient has reached or is progressing toward set goals      []Patient is non-compliant or has abdicated      []Due to lack of appreciable progress towards set goals    Castro DIA 11/15/2019 8:24 AM

## 2019-11-15 NOTE — PROGRESS NOTES
OT DAILY TREATMENT NOTE 10-18    Patient Name: Morgan Farrar  Date:11/15/2019  : 1955  [x]  Patient  Verified  Payor: SELF PAY / Plan: [facility] SELF PAY / Product Type: Self Pay /    In time:7:28  Out time:8:15  Total Treatment Time (min): 52  Visit #: 7 of 12    Treatment Area: Colles' fracture of left radius, subsequent encounter for closed fracture with routine healing [U30.538E]    SUBJECTIVE  Pain Level (0-10 scale): 1/10  Any medication changes, allergies to medications, adverse drug reactions, diagnosis change, or new procedure performed?: [x] No    [] Yes (see summary sheet for update)  Subjective functional status/changes:   [] No changes reported  I only have trouble extending my wrist now    OBJECTIVE    Modality rationale: decrease pain to improve the patients ability to grasp   Min Type Additional Details    [] Estim:  []Unatt       []IFC  []Premod                        []Other:  []w/ice   []w/heat  Position:  Location:    [] Estim: []Att    []TENS instruct  []NMES                    []Other:  []w/US   []w/ice   []w/heat  Position:  Location:    []  Traction: [] Cervical       []Lumbar                       [] Prone          []Supine                       []Intermittent   []Continuous Lbs:  [] before manual  [] after manual    []  Ultrasound: []Continuous   [] Pulsed                           []1MHz   []3MHz W/cm2:  Location:    []  Iontophoresis with dexamethasone         Location: [] Take home patch   [] In clinic    []  Ice     []  heat  []  Ice massage  []  Laser   []  Anodyne Position:  Location:   10 min []  Laser with stim  [x]  Other: paraffin Position:  Location: left hand    []  Vasopneumatic Device Pressure:       [] lo [] med [] hi   Temperature: [] lo [] med [] hi       [x] Skin assessment post-treatment:  [x]intact []redness- no adverse reaction    []redness - adverse reaction:     37 min Therapeutic Exercise:  [] See flow sheet :   Rationale: increase ROM and increase strength to improve the patients ability to freely move wrist    Recheck , pinch, and ROM of left hand/wrist  AAROM left wrist flex/ext 10 x 3 sets  Left hand passive stretch flex/ext using 2# weight 10 x 3 sets    With   [] TE   [] TA   [] neuro   [] other: Patient Education: [x] Review HEP  Review PROM wrist flexion,estension, palmar abduction of left hand. [] Progressed/Changed HEP based on:   [] positioning   [] body mechanics   [] transfers   [] heat/ice application   [] Splint wear/care   [] Sensory re-education   [] scar management      [] other:            Other Objective/Functional Measures:     (11/15 recheck in bold)   Right        Left  Wrist Flex 0-80 59 55 60         Ext 0-70 78 46 53         Ulnar Dev 0-30 46 30 30         Radial Dev 0-20 21 15 24          Hand ROM (left/right)  11/15 recheck in bold    Index Middle Ring Small Thumb     MP           CMC   PIP           MP   DIP           IP             45(51)/50  Nguyen Abd             42(56)/31  Radial Abd      Hand Strength: (11/15 recheck in bold)    Gross Grasp 3pt Pinch Lateral Pinch Tip Pinch   Right  43 11 12 10   Left  unable (22) Unable (8)  Unable (10)  Unable (4)         Pain Level (0-10 scale) post treatment: 1/10    ASSESSMENT/Changes in Function: ROM increasing in left wrist    Patient will continue to benefit from skilled OT services to modify and progress therapeutic interventions, address ROM deficits, address strength deficits and instruct in home and community integration to attain remaining goals. []  See Plan of Care  []  See progress note/recertification  [x]  See Discharge Summary         Progress towards goals / Updated goals:  1. Patient will be independent with HEP for wrist ROM for brushing teeth  Status last visit: HEP review 10/22/19 Met 10/29/19     2. Patient will be familiar with activity modifications to reduce pain in left wrist  Status last visit: Discussed using brace every day per MD. Met 10/29/19      3.  Patient will be independent with HEP for hand strengthening for carrying groceries  Status last visit: HEP review 10/22/19 Met 10/29/19     Long Term Goals: To be accomplished in 4 weeks:              1. Patient will increase extension of left wrist by 20 degrees for weight bearing on left wrist  Status last visit: +7 degrees 11/15/19     2. Patient will report pain of 0-4/10 while performing household tasks  Status last visit: Pain reported at 3/10 for active wrist extension during in-clinic tasks. Goal met. 11/5/19     3. Patient will have a  to at least 25 pounds on left hand for carrying large objects at work  Status last visit:  22lbs 11/15/19     4.  Patient will increase ability to perform functional tasks at work as shown by an improved FOTO score of at [facility]  Status at last visit: 68 Goal met 11/15/19    PLAN  []  Upgrade activities as tolerated     []  Continue plan of care  []  Update interventions per flow sheet       [x]  Discharge due to: Goals partially met  []  Other:_      IFEOMA Torres 11/15/2019  7:34 AM    Future Appointments   Date Time Provider Renato Ortiz   11/19/2019  7:30 AM Nolberto Carvajal OTR/L [facility] [address]   11/20/2019  9:10 AM Hodan Beck DO [facility] RADHA MARLA   11/21/2019  5:00 PM Nolberto Carvajal OTR/L [facility] [address]

## 2019-11-19 ENCOUNTER — APPOINTMENT (OUTPATIENT)
Dept: PHYSICAL THERAPY | Age: 64
End: 2019-11-19
Payer: SELF-PAY

## 2019-11-20 ENCOUNTER — OFFICE VISIT (OUTPATIENT)
Dept: ORTHOPEDIC SURGERY | Facility: CLINIC | Age: 64
End: 2019-11-20

## 2019-11-20 VITALS
HEIGHT: 67 IN | HEART RATE: 77 BPM | TEMPERATURE: 97.5 F | SYSTOLIC BLOOD PRESSURE: 130 MMHG | OXYGEN SATURATION: 99 % | RESPIRATION RATE: 18 BRPM | WEIGHT: 169.8 LBS | BODY MASS INDEX: 26.65 KG/M2 | DIASTOLIC BLOOD PRESSURE: 66 MMHG

## 2019-11-20 DIAGNOSIS — M18.12 PRIMARY OSTEOARTHRITIS OF FIRST CARPOMETACARPAL JOINT OF LEFT HAND: Primary | ICD-10-CM

## 2019-11-20 DIAGNOSIS — S52.532D CLOSED COLLES' FRACTURE OF LEFT RADIUS WITH ROUTINE HEALING, SUBSEQUENT ENCOUNTER: ICD-10-CM

## 2019-11-20 NOTE — PROGRESS NOTES
Kate Tolentino is a 60 y.o. female right handed . Worker's Compensation and legal considerations: not known. Vitals:    11/20/19 0843   BP: 130/66   Pulse: 77   Resp: 18   Temp: 97.5 °F (36.4 °C)   TempSrc: Oral   SpO2: 99%   Weight: 169 lb 12.8 oz (77 kg)   Height: 5' 7\" (1.702 m)   PainSc:   0 - No pain   PainLoc: Wrist           Chief Complaint   Patient presents with    Wrist Pain     Left         HPI: Patient comes in today for follow-up regarding her left distal radius fracture. At her last visit we did send her to therapy as she has had a healed fracture however continued pain in the wrist.  She reports that her wrist pain is completely resolved however she is having pain at the base of her thumb on the same side. Previous HPI:  Pt comes in for follow-up 6 weeks s/p Left Distal Radius Fracture. She has been in a cast for that entire time. She reports continued pain and stiffness in the wrist joint. She denies any injuries or falls since last visit    Previous HPI: Patient comes in today for follow-up approximately 2 weeks after being placed into a short arm cast for a left distal radius fracture. She reports some intermittent pain the more active she is. Otherwise her pain is controlled. Initial HPI: Patient comes in today after sustaining a fall approximately a week ago and being diagnosed with a left distal radius fracture. She has been in a splint since that time.     Date of onset: August 13, 2019    Injury: Yes: Comment: Fall    Prior Treatment:  Yes: Comment: Emergency department splint    Numbness/ Tingling: No    ROS: Review of Systems - General ROS: negative  Respiratory ROS: no cough, shortness of breath, or wheezing  Cardiovascular ROS: no chest pain or dyspnea on exertion  Musculoskeletal ROS: positive for - pain in wrist - left  Neurological ROS: negative  Dermatological ROS: negative    Past Medical History:   Diagnosis Date    DJD (degenerative joint disease) 2/10/2010  DVT (deep venous thrombosis) ([facility] [address] 2/10/2010    Environmental allergies 2/10/2010    HNP (herniated nucleus pulposus), cervical 2/10/2010    Neuropathy 2/10/2010    Osteopenia 2/10/2010    Pernicious anemia 2/10/2010    Vitamin D deficiency 2/10/2010       Past Surgical History:   Procedure Laterality Date    HX CHOLECYSTECTOMY      gastric bypass 2001    HX UROLOGICAL      kidney stone removal       Current Outpatient Medications   Medication Sig Dispense Refill    triamcinolone acetonide (KENALOG) 10 mg/mL injection 1 mL by Intra artICUlar route once for 1 dose. 1 Vial 0    traMADol (ULTRAM) 50 mg tablet Take 50 mg by mouth every six (6) hours as needed for Pain.  ergocalciferol (ERGOCALCIFEROL) 50,000 unit capsule Take 1 Cap by mouth every Sunday. 4 Cap 4    cyanocobalamin (NEURO B-12 FORTE S) 1,000 mcg/mL injection 1,000 mcg by IntraMUSCular route every thirty (30) days.  calcium-vitamin D (OYSTER SHELL) 500 mg(1,250mg) -200 unit per tablet Take 1 Tab by mouth two (2) times daily (with meals).  multivitamin, stress formula (STRESS TAB) tablet Take 1 Tab by mouth daily.  HYDROcodone-acetaminophen (NORCO) 5-325 mg per tablet Take 1 Tab by mouth every four (4) hours as needed for Pain. Max Daily Amount: 6 Tabs. 6 Tab 0    mometasone (NASONEX) 50 mcg/Actuation nasal spray 2 Sprays by Nasal route daily. 1 Container 2    alendronate (FOSAMAX) 70 mg tablet Take 70 mg by mouth Every Saturday.          Allergies   Allergen Reactions    Codeine Nausea and Vomiting         PE:     Left Wrist: No tenderness to palpation and full range of motion    Hand:    Examination L Digit(s) R Digit(s)   1st CMC Tenderness +  -    1st CMC Grind +  -    Mark Nodes -  -    [place] Nodes -  -    A1 Pulley Tenderness -  -    Triggering -  -    UCL Instability -  -    RCL Instability -  -    Lateral Stress Pain -  -    Palmar Cords -  -    Tabletop test -  -    Garrod's Pads -  -     Strength Pinch Strength         ROM: Full      Imaging:     Plain films of the left wrist shows degenerative changes about the thumb CMC joint [place] stage II-III. The healed distal radius fracture does not show any change since her last visit. ICD-10-CM ICD-9-CM    1. Primary osteoarthritis of first carpometacarpal joint of left hand M18.12 715.14 AMB SUPPLY ORDER      TRIAMCINOLONE ACETONIDE INJ      triamcinolone acetonide (KENALOG) 10 mg/mL injection      DRAIN/INJECT SMALL JOINT/BURSA   2. Closed Colles' fracture of left radius with routine healing, subsequent encounter S52.532D V54.12 AMB POC XRAY, WRIST; COMPLETE, 3+ VIE       Plan:     Left thumb CMC joint injection    Left thumb cool comfort brace    Continue exercises that therapy taught her for her thumb and her wrist    Follow-up PRN    Plan was reviewed with patient, who verbalized agreement and understanding of the plan    [address] NOTE        Chart reviewed for the following:   Brent MCLEOD DO, have reviewed the History, Physical and updated the Allergic reactions for [place] performed immediately prior to start of procedure:   Roxanne MCLEOD DO, have performed the following reviews on [facility] prior to the start of the procedure:            * Patient was identified by name and date of birth   * Agreement on procedure being performed was verified  * Risks and Benefits explained to the patient  * Procedure site verified and marked as necessary  * Patient was positioned for comfort  * Consent was signed and verified     Time: 09:30 AM      Date of procedure: 11/20/2019    Procedure performed by:   Roxanne Harmon DO    Provider assisted by: Lilly Mayen LPN    Patient assisted by: self    How tolerated by patient: tolerated the procedure well with no complications    Post Procedural Pain Scale: 0 - No Hurt    Comments: none    Procedure:  After consent was obtained, using sterile technique the joint was prepped. Local anesthetic used: 1% lidocaine. Kenalog 5 mg and was then injected and the needle withdrawn. The procedure was well tolerated. The patient is asked to continue to rest the area for a few more days before resuming regular activities. It may be more painful for the first 1-2 days. Watch for fever, or increased swelling or persistent pain in the joint. Call or return to clinic prn if such symptoms occur or there is failure to improve as anticipated.

## 2019-11-21 ENCOUNTER — APPOINTMENT (OUTPATIENT)
Dept: PHYSICAL THERAPY | Age: 64
End: 2019-11-21
Payer: SELF-PAY

## 2020-03-23 ENCOUNTER — TELEPHONE (OUTPATIENT)
Dept: INTERNAL MEDICINE CLINIC | Age: 65
End: 2020-03-23

## 2021-10-28 ENCOUNTER — OFFICE VISIT (OUTPATIENT)
Dept: ORTHOPEDIC SURGERY | Age: 66
End: 2021-10-28
Payer: COMMERCIAL

## 2021-10-28 VITALS
WEIGHT: 162 LBS | HEART RATE: 82 BPM | DIASTOLIC BLOOD PRESSURE: 79 MMHG | TEMPERATURE: 96.7 F | RESPIRATION RATE: 16 BRPM | HEIGHT: 67 IN | BODY MASS INDEX: 25.43 KG/M2 | SYSTOLIC BLOOD PRESSURE: 137 MMHG | OXYGEN SATURATION: 99 %

## 2021-10-28 DIAGNOSIS — R20.0 NUMBNESS AND TINGLING IN BOTH HANDS: ICD-10-CM

## 2021-10-28 DIAGNOSIS — M79.18 CERVICAL MYOFASCIAL PAIN SYNDROME: ICD-10-CM

## 2021-10-28 DIAGNOSIS — M48.061 SPINAL STENOSIS OF LUMBAR REGION WITHOUT NEUROGENIC CLAUDICATION: Primary | ICD-10-CM

## 2021-10-28 DIAGNOSIS — R20.2 NUMBNESS AND TINGLING IN BOTH HANDS: ICD-10-CM

## 2021-10-28 PROCEDURE — 99204 OFFICE O/P NEW MOD 45 MIN: CPT | Performed by: PHYSICAL MEDICINE & REHABILITATION

## 2021-10-28 RX ORDER — OMEPRAZOLE 10 MG/1
10 CAPSULE, DELAYED RELEASE ORAL DAILY
COMMUNITY

## 2021-10-28 RX ORDER — CYCLOBENZAPRINE HCL 5 MG
5 TABLET ORAL
COMMUNITY

## 2021-10-28 RX ORDER — BUPROPION HYDROCHLORIDE 75 MG/1
TABLET ORAL 2 TIMES DAILY
COMMUNITY

## 2021-10-28 NOTE — PROGRESS NOTES
[address]  [address], [address]  [facility], [address]  Phone: [phone]  Fax: [phone]        Kel Mejia  : 1955  PCP: Sophie Nguyen MD  10/28/2021    NEW PATIENT      HISTORY OF PRESENT ILLNESS  Timothy Villarreal is a 61 y.o. female c/o low back pain radiating into the buttocks (back > buttocks). Her pain is worse with prolonged standing/walking and improves with sitting. She denies radicular symptoms in the lower extremities. She has worn a back brace at work with some benefit. She has used muscle relaxants with benefit. Lumbar spine MRI dated 21 reviewed. Per report, Subacute benign-appearing compression fracture T11 vertebral body with moderate loss of height, without significant stenosis at this level. Chronic compression fractures L1 and L2 vertebral bodies. Severe acquired spinal canal stenosis L4/5 with moderate to severe bilateral subarticular foraminal stenosis. Marrow edema S2 and S3 sacral body likely related to acute/subacute sacral insufficiency fracture. This can be further evaluated with CT sacrum. 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 She sometimes drops things. She finds benefit with Elavil at night. She also takes Tramadol prescribed by her PCP. Pain Score: 1/10. Treatments patient has tried:  Physical therapy:No  Doing HEP: Unknown  Non-opioid medications: Yes  Spinal injections: No  Spinal surgery- No.   Last Lumbar MRI:     PmHx: h/o gastric bypass; neuropathy related to B12 deficiency   Social Hx: she works doing custom picture frames     ASSESSMENT  Timothy Villarreal is a 61year-old female with low back pain radiating into the buttocks. Her symptoms may be due to lumbar spinal stenosis (severe L4-5). She also reports numbness and tingling in the hands that she attributes to neuropathy related to B12 deficiency related to her gastric bypass.  She also c/o neck pain that is likely myofascial in nature. We discussed options of: BUE EMG (B12 neuropathy)       PLAN  1. BUE EMG - evaluate neuropathy (related to B12 deficiency per Pt)  2. Provided exercises to add to HEP. Pt will f/u in for BUE EMG (1 hr) or sooner if needed. Diagnoses and all orders for this visit:    1. Spinal stenosis of lumbar region without neurogenic claudication    2. Cervical myofascial pain syndrome    3. Numbness and tingling in both hands  -     EMG TWO EXTREMITIES UPPER; Future         CHIEF Corrinne Angry is seen today in consultation at the request of Nikki Thomas MD for complaints of low back pain. PAST MEDICAL HISTORY   Past Medical History:   Diagnosis Date    DJD (degenerative joint disease) 2/10/2010    DVT (deep venous thrombosis) ([address] 2/10/2010    Environmental allergies 2/10/2010    HNP (herniated nucleus pulposus), cervical 2/10/2010    Neuropathy 2/10/2010    Osteopenia 2/10/2010    Pernicious anemia 2/10/2010    Vitamin D deficiency 2/10/2010       Past Surgical History:   Procedure Laterality Date    HX CHOLECYSTECTOMY      gastric bypass 2001    HX UROLOGICAL      kidney stone removal       MEDICATIONS      Current Outpatient Medications   Medication Sig Dispense Refill    cyclobenzaprine (FLEXERIL) 5 mg tablet Take 5 mg by mouth.  omeprazole (PRILOSEC) 10 mg capsule Take 10 mg by mouth daily.  buPROPion (WELLBUTRIN) 75 mg tablet Take  by mouth two (2) times a day.  traMADol (ULTRAM) 50 mg tablet Take 50 mg by mouth every six (6) hours as needed for Pain.  ergocalciferol (ERGOCALCIFEROL) 50,000 unit capsule Take 1 Cap by mouth every Sunday. 4 Cap 4    cyanocobalamin (NEURO B-12 FORTE S) 1,000 mcg/mL injection 1,000 mcg by IntraMUSCular route every thirty (30) days.  calcium-vitamin D (OYSTER SHELL) 500 mg(1,250mg) -200 unit per tablet Take 1 Tab by mouth two (2) times daily (with meals).       multivitamin, stress formula (STRESS TAB) tablet Take 1 Tab by mouth daily.  HYDROcodone-acetaminophen (NORCO) 5-325 mg per tablet Take 1 Tab by mouth every four (4) hours as needed for Pain. Max Daily Amount: 6 Tabs. (Patient not taking: Reported on 10/28/2021) 6 Tab 0    mometasone (NASONEX) 50 mcg/Actuation nasal spray 2 Sprays by Nasal route daily. (Patient not taking: Reported on 10/28/2021) 1 Container 2    alendronate (FOSAMAX) 70 mg tablet Take 70 mg by mouth Every Saturday. (Patient not taking: Reported on 10/28/2021)         ALLERGIES    Allergies   Allergen Reactions    Codeine Nausea and Vomiting          SOCIAL HISTORY    Social History     Socioeconomic History    Marital status:      Spouse name: Not on file    Number of children: Not on file    Years of education: Not on file    Highest education level: Not on file   Tobacco Use    Smoking status: Never Smoker    Smokeless tobacco: Never Used   Substance and Sexual Activity    Alcohol use: No    Drug use: No     Social Determinants of Health     Financial Resource Strain:     Difficulty of Paying Living Expenses:    Food Insecurity:     Worried About Running Out of Food in the Last Year:     [address] in the Last Year:    Transportation Needs:     Lack of Transportation (Medical):      Lack of Transportation (Non-Medical):    Physical Activity:     Days of Exercise per Week:     Minutes of Exercise per Session:    Stress:     Feeling of Stress :    Social Connections:     Frequency of Communication with Friends and Family:     Frequency of Social Gatherings with Friends and Family:     Attends [employer] Services:     Active Member of Clubs or Organizations:     Attends Club or Organization Meetings:     Marital Status:        FAMILY HISTORY    Family History   Problem Relation Age of Onset    Hypertension Mother     Cancer Mother         breast    Breast Cancer Mother 68         REVIEW OF SYSTEMS  Review of Systems Constitutional: Negative for chills, fever and weight loss. Respiratory: Negative for shortness of breath. Cardiovascular: Negative for chest pain. Gastrointestinal: Negative for constipation. Negative for fecal incontinence    Genitourinary: Negative for dysuria. Negative for urinary incontinence   Musculoskeletal: Positive for back pain. Bilateral buttock pain   Skin: Negative for rash. Neurological: Negative for dizziness, tingling, tremors, focal weakness and headaches. Endo/Heme/Allergies: Does not bruise/bleed easily. Psychiatric/Behavioral: The patient does not have insomnia. PHYSICAL EXAMINATION  Visit Vitals  /79 (BP 1 Location: Right arm, BP Patient Position: Sitting, BP Cuff Size: Adult)   Pulse 82   Temp (!) 96.7 °F (35.9 °C) (Tympanic)   Resp 16   Ht 5' 7\" (1.702 m)   Wt 162 lb (73.5 kg)   SpO2 99%   BMI 25.37 kg/m²         Pain Assessment  10/28/2021   Location of Pain Back   Location Modifiers -   Severity of Pain 1   Quality of Pain Other (Comment)   Quality of Pain Comment pulling   Duration of Pain A few hours   Frequency of Pain Several times daily   Date Pain First Started 8/30/2021   Aggravating Factors Walking;Stairs;Standing   Aggravating Factors Comment -   Limiting Behavior Yes   Relieving Factors Rest   Result of Injury No   Work-Related Injury -   Type of Injury Fall         Constitutional:  Well developed, well nourished, in no acute distress. Psychiatric: Affect and mood are appropriate. HEENT: Normocephalic, atraumatic. Extraocular movements intact. Integumentary: No rashes or abrasions noted on exposed areas. Cardiovascular: Regular rate and rhythm. Pulmonary: Clear to auscultation bilaterally. SPINE/MUSCULOSKELETAL EXAM  Cervical spine:  Neck is midline. Normal muscle tone. No focal atrophy is noted. ROM painful in all directions  Shoulder ROM intact. Tenderness to palpation scalenes  Negative Spurling's sign. Negative Tinel's sign. Negative Tinajero's sign. Sensation in the bilateral arms grossly intact to light touch. Lumbar spine:  No rash, ecchymosis, or gross obliquity. No fasciculations. No focal atrophy is noted. No pain with hip ROM. Full range of motion. No tenderness to palpation. No tenderness to palpation at the sciatic notch. SI joints non-tender. Trochanters non tender. Sensation in the bilateral legs grossly intact to light touch. No pain with lumbar extension. MOTOR:      Biceps  Triceps Deltoids Wrist Ext Wrist Flex Hand Intrin   Right 5/5 5/5 5/5 5/5 5/5 5/5   Left 5/5 5/5 5/5 5/5 5/5 5/5             Hip Flex  Quads Hamstrings Ankle DF EHL Ankle PF   Right 5/5 5/5 5/5 5/5 5/5 5/5   Left 5/5 5/5 5/5 5/5 5/5 5/5     DTRs are 3+ biceps, triceps, brachioradialis, patella, and Achilles. Negative Straight Leg raise. Squat not tested. No difficulty with tandem gait. Ambulation without assistive device. FWB. RADIOGRAPHS/DATA  Lumbar MRI images taken on 9/20/21 personally reviewed with patient:  There is mild anterior listhesis L4/5 and L5/S1. There is horizontal fracture line through the inferior aspect T11 vertebral body extending to the posterior wall with very slight posterior bowing of the wall, with moderate anterior and central loss of height and extensive adjacent marrow edema within the vertebral body. No significant canal stenosis at this level. The degree of vertebral body height loss is stable since recent plain film. There are chronic appearing superior endplate fractures L1 and L2 vertebral bodies with moderate loss of height L1 and mild to moderate loss of height L2 vertebral body. Additional chronic appearing endplate Schmorl's node defects L3-L5. No evidence of spondylolysis.  No other marrow edema or neoplastic marrow signal. The conus medullaris is located at the T12/L1 level and has a normal appearance and signal.     There is mildly dilated extrahepatic common bile duct with likely prior cholecystectomy, likely reservoir effect from cholecystectomy. Visualized retroperitoneum unremarkable. There is abnormal low T1 and increased STIR signal within the S2 and S3 sacral body. L1/2 level: Unremarkable. L2/3 level: Mild facet arthropathy with no disc abnormality or significant stenosis. L3/4 level: Mild facet arthropathy with no disc abnormality or significant stenosis. L4/5 level: Severe bilateral facet arthropathy with ligamentum flavum thickening and prominent dorsal epidural fat with minimal disc bulge. There is severe canal stenosis AP diameter canal 4-5 mm with very little residual CSF signal surrounding crowded cauda equina. Moderate to severe left-sided and moderate right-sided subarticular foraminal stenosis. L5/S1 level: Moderate to severe facet arthropathy worse on the right with no significant canal or lateral recess stenosis with mild to moderate right-sided subarticular foraminal stenosis. _______________     IMPRESSION:       1. Subacute benign-appearing compression fracture T11 vertebral body with moderate loss of height, without significant stenosis at this level. 2. Chronic compression fractures L1 and L2 vertebral bodies. 3. Severe acquired spinal canal stenosis L4/5 with moderate to severe bilateral subarticular foraminal stenosis. 4. Marrow edema S2 and S3 sacral body likely related to acute/subacute sacral insufficiency fracture. This can be further evaluated with CT sacrum. 25 minutes of face-to-face contact were spent with the patient during today's visit extensively discussing symptoms and treatment plan. All questions were answered. More than half of this visit today was spent on counseling. Written by Scotty Harvey, as dictated by Dr. Ashley Palomares.

## 2021-10-28 NOTE — PATIENT INSTRUCTIONS
Lumbar Spinal Stenosis: Care Instructions  Your Care Instructions     Stenosis in the spine is a narrowing of the canal that is around the spinal cord and nerve roots in your back. It can happen as part of aging. Sometimes bone and other tissue grow into this canal and press on the nerves that branch out from the spinal cord. This can cause pain, numbness, and weakness. When it happens in the lower part of your back, it is called lumbar spinal stenosis. It can cause problems in the legs, feet, and rear end (buttocks). You may be able to get relief from the symptoms of spinal stenosis by taking pain medicine. Your doctor may suggest physical therapy and exercises to keep your spine strong and flexible. Some people try steroid shots to reduce swelling. If pain and numbness in your legs are still so bad that you cannot do your normal activities, you may need surgery. Follow-up care is a key part of your treatment and safety. Be sure to make and go to all appointments, and call your doctor if you are having problems. It's also a good idea to know your test results and keep a list of the medicines you take. How can you care for yourself at home? · Take an over-the-counter pain medicine. Nonsteroidal anti-inflammatory drugs (NSAIDs) such as ibuprofen or naproxen seem to work best. But if you can't take NSAIDs, you can try acetaminophen. Be safe with medicines. Read and follow all instructions on the label. · Do not take two or more pain medicines at the same time unless the doctor told you to. Many pain medicines have acetaminophen, which is Tylenol. Too much acetaminophen (Tylenol) can be harmful. · Stay at a healthy weight. Being overweight puts extra strain on your spine. · Change positions often when you sit or stand. This can ease pain. It may also reduce pressure on the spinal cord and its nerves. · Avoid doing things that make your symptoms worse.  Walking downhill and standing for a long time may cause pain.  · Stretch and strengthen your back muscles as your doctor or physical therapist recommends. If your doctor says it is okay to do them, these exercises may help. ? Lie on your back with your knees bent. Gently pull one bent knee to your chest. Put that foot back on the floor, and then pull the other knee to your chest.  ? Do pelvic tilts. Lie on your back with your knees bent. Tighten your stomach muscles. Pull your belly button (navel) in and up toward your ribs. You should feel like your back is pressing to the floor and your hips and pelvis are slightly lifting off the floor. Hold for 6 seconds while breathing smoothly. ? Stand with your back flat against a wall. Slowly slide down until your knees are slightly bent. Hold for 10 seconds, then slide back up the wall. · Remove or change anything in your house that may cause you to fall. Keep walkways clear of clutter, electrical cords, and throw rugs. When should you call for help? Call 911 anytime you think you may need emergency care. For example, call if:    · You are unable to move a leg at all. Call your doctor now or seek immediate medical care if:    · You have new or worse symptoms in your legs, belly, or buttocks. Symptoms may include:  ? Numbness or tingling. ? Weakness. ? Pain.     · You lose bladder or bowel control. Watch closely for changes in your health, and be sure to contact your doctor if:    · You have a fever, lose weight, or don't feel well.     · You are not getting better as expected. Where can you learn more? Go to http://www.gray.com/  Enter X327 in the search box to learn more about \"Lumbar Spinal Stenosis: Care Instructions. \"  Current as of: July 1, 2021               Content Version: 13.0  © 7215-0343 [employer]. Care instructions adapted under license by [employer] (which disclaims liability or warranty for this information).  If you have questions about a medical condition or this instruction, always ask your healthcare professional. [address] any warranty or liability for your use of this information. Low Back Pain: Exercises  Introduction  Here are some examples of exercises for you to try. The exercises may be suggested for a condition or for rehabilitation. Start each exercise slowly. Ease off the exercises if you start to have pain. You will be told when to start these exercises and which ones will work best for you. How to do the exercises  Press-up    1. Lie on your stomach, supporting your body with your forearms. 2. Press your elbows down into the floor to raise your upper back. As you do this, relax your stomach muscles and allow your back to arch without using your back muscles. As your press up, do not let your hips or pelvis come off the floor. 3. Hold for 15 to 30 seconds, then relax. 4. Repeat 2 to 4 times. Alternate arm and leg (bird dog) exercise    Do this exercise slowly. Try to keep your body straight at all times, and do not let one hip drop lower than the other. 1. Start on the floor, on your hands and knees. 2. Tighten your belly muscles. 3. Raise one leg off the floor, and hold it straight out behind you. Be careful not to let your hip drop down, because that will twist your trunk. 4. Hold for about 6 seconds, then lower your leg and switch to the other leg. 5. Repeat 8 to 12 times on each leg. 6. Over time, work up to holding for 10 to 30 seconds each time. 7. If you feel stable and secure with your leg raised, try raising the opposite arm straight out in front of you at the same time. Knee-to-chest exercise    1. Lie on your back with your knees bent and your feet flat on the floor. 2. Bring one knee to your chest, keeping the other foot flat on the floor (or keeping the other leg straight, whichever feels better on your lower back). 3. Keep your lower back pressed to the floor.  Hold for at least 15 to 30 seconds. 4. Relax, and lower the knee to the starting position. 5. Repeat with the other leg. Repeat 2 to 4 times with each leg. 6. To get more stretch, put your other leg flat on the floor while pulling your knee to your chest.  Curl-ups    1. Lie on the floor on your back with your knees bent at a 90-degree angle. Your feet should be flat on the floor, about 12 inches from your buttocks. 2. Cross your arms over your chest. If this bothers your neck, try putting your hands behind your neck (not your head), with your elbows spread apart. 3. Slowly tighten your belly muscles and raise your shoulder blades off the floor. 4. Keep your head in line with your body, and do not press your chin to your chest.  5. Hold this position for 1 or 2 seconds, then slowly lower yourself back down to the floor. 6. Repeat 8 to 12 times. Pelvic tilt exercise    1. Lie on your back with your knees bent. 2. \"Brace\" your stomach. This means to tighten your muscles by pulling in and imagining your belly button moving toward your spine. You should feel like your back is pressing to the floor and your hips and pelvis are rocking back. 3. Hold for about 6 seconds while you breathe smoothly. 4. Repeat 8 to 12 times. Heel dig bridging    1. Lie on your back with both knees bent and your ankles bent so that only your heels are digging into the floor. Your knees should be bent about 90 degrees. 2. Then push your heels into the floor, squeeze your buttocks, and lift your hips off the floor until your shoulders, hips, and knees are all in a straight line. 3. Hold for about 6 seconds as you continue to breathe normally, and then slowly lower your hips back down to the floor and rest for up to 10 seconds. 4. Do 8 to 12 repetitions. Hamstring stretch in doorway    1. Lie on your back in a doorway, with one leg through the open door. 2. Slide your leg up the wall to straighten your knee.  You should feel a gentle stretch down the back of your leg. 3. Hold the stretch for at least 15 to 30 seconds. Do not arch your back, point your toes, or bend either knee. Keep one heel touching the floor and the other heel touching the wall. 4. Repeat with your other leg. 5. Do 2 to 4 times for each leg. Hip flexor stretch    1. Kneel on the floor with one knee bent and one leg behind you. Place your forward knee over your foot. Keep your other knee touching the floor. 2. Slowly push your hips forward until you feel a stretch in the upper thigh of your rear leg. 3. Hold the stretch for at least 15 to 30 seconds. Repeat with your other leg. 4. Do 2 to 4 times on each side. Wall sit    1. Stand with your back 10 to 12 inches away from a wall. 2. Lean into the wall until your back is flat against it. 3. Slowly slide down until your knees are slightly bent, pressing your lower back into the wall. 4. Hold for about 6 seconds, then slide back up the wall. 5. Repeat 8 to 12 times. Follow-up care is a key part of your treatment and safety. Be sure to make and go to all appointments, and call your doctor if you are having problems. It's also a good idea to know your test results and keep a list of the medicines you take. Where can you learn more? Go to http://www.gray.com/  Enter U609 in the search box to learn more about \"Low Back Pain: Exercises. \"  Current as of: July 1, 2021               Content Version: 13.0  © 2006-2021 [employer]. Care instructions adapted under license by [employer] (which disclaims liability or warranty for this information). If you have questions about a medical condition or this instruction, always ask your healthcare professional. [place] [address] any warranty or liability for your use of this information. Electromyogram (EMG) and Nerve Conduction Studies: About These Tests  What are they?   An electromyogram (EMG) measures the electrical activity of your muscles when you are not using them (at rest) and when you tighten them (muscle contraction). Nerve conduction studies (NCS) measure how well and how fast the nerves can send electrical signals. EMG and nerve conduction studies are often done together. If they are done together, the nerve conduction studies are done before the EMG. Why are they done? You may need an EMG to find diseases that damage your muscles or nerves or to find why you can't move your muscles (paralysis), why they feel weak, or why they twitch. These problems may include a herniated disc, amyotrophic lateral sclerosis (ALS), or myasthenia gravis (MG). You may need nerve conduction studies to find damage to the nerves that lead from the brain and spinal cord to the rest of the body. (This is called the peripheral nervous system.) These studies are often used to help find nerve disorders, such as carpal tunnel syndrome. How do you prepare for these tests?    · Wear loose-fitting clothing. You may be given a hospital gown to wear.     · The electrodes for the test are attached to your skin. Your skin needs to be clean and free of sprays, oils, creams, and lotions.     · You may be asked to sign a consent form that says you understand the risks of the test and agree to have it done. · Tell your doctor ALL the medicines, vitamins, supplements, and herbal remedies you take. Some may increase the risk of problems during your test. Your doctor will tell you if you should stop taking any of them before the test and how soon to do it.     · If you take aspirin or some other blood thinner, ask your doctor if you should stop taking it before your test. Make sure that you understand exactly what your doctor wants you to do. These medicines increase the risk of bleeding. How are the tests done? You lie on a table or bed or sit in a reclining chair so your muscles are relaxed.   For an EMG:   · Your doctor will insert a needle electrode into a muscle. This will record the electrical activity while the muscle is at rest.  · Your doctor will ask you to tighten the same muscle slowly and steadily while the electrical activity is recorded. · Your doctor may move the electrode to a different area of the muscle or a different muscle. For nerve conduction studies:   · Your doctor will attach two types of electrodes to your skin. ? One type of electrode is placed over a nerve and will give the nerve an electrical pulse. ? The other type of electrode is placed over the muscle that the nerve controls. It will record how long it takes the muscle to react to the electrical pulse. How does having electromyogram (EMG) and nerve conduction studies feel? During an EMG test, you may feel a quick, sharp pain when the needle electrode is put into a muscle. With nerve conduction studies, you will be able to feel the electrical pulses. The tests make some people anxious. Keep in mind that only a very low-voltage electrical current is used. And each electrical pulse is very quick. It lasts less than a second. How long do they take? · An EMG may take 30 to 60 minutes. · Nerve conduction tests may take from 15 minutes to 1 hour or more. It depends on how many nerves and muscles your doctor tests. What happens after these tests? · After the test, you may be sore and feel a tingling in your muscles. This may last for up to 2 days. · If any of the test areas are sore:  ? Put ice or a cold pack on the area for 10 to 20 minutes at a time. Put a thin cloth between the ice and your skin. ? Take an over-the-counter pain medicine, such as acetaminophen (Tylenol), ibuprofen (Advil, Motrin), or naproxen (Aleve). Be safe with medicines. Read and follow all instructions on the label. · You will probably be able to go home right away. It depends on the reason for the test.  · You can go back to your usual activities right away.   When should you call for help?  Watch closely for changes in your health, and be sure to contact your doctor if:  · Muscle pain from an EMG test gets worse or you have swelling, tenderness, or pus at any of the needle sites. · You have any problems that you think may be from the test.  · You have any questions about the test or have not received your results. Follow-up care is a key part of your treatment and safety. Be sure to make and go to all appointments, and call your doctor if you are having problems. It's also a good idea to keep a list of the medicines you take. Ask your doctor when you can expect to have your test results. Where can you learn more? Go to http://www.gray.com/  Enter [id number] in the search box to learn more about \"Electromyogram (EMG) and Nerve Conduction Studies: About These Tests. \"  Current as of: April 8, 2021               Content Version: 13.0  © 7238-3060 [employer]. Care instructions adapted under license by [employer] (which disclaims liability or warranty for this information). If you have questions about a medical condition or this instruction, always ask your healthcare professional. [place] [address] any warranty or liability for your use of this information.

## 2021-11-04 DIAGNOSIS — R20.0 NUMBNESS AND TINGLING IN BOTH HANDS: ICD-10-CM

## 2021-11-04 DIAGNOSIS — R20.2 NUMBNESS AND TINGLING IN BOTH HANDS: ICD-10-CM

## 2021-11-17 ENCOUNTER — OFFICE VISIT (OUTPATIENT)
Dept: ORTHOPEDIC SURGERY | Age: 66
End: 2021-11-17

## 2021-11-17 VITALS
TEMPERATURE: 97.3 F | SYSTOLIC BLOOD PRESSURE: 124 MMHG | HEART RATE: 94 BPM | OXYGEN SATURATION: 100 % | DIASTOLIC BLOOD PRESSURE: 58 MMHG | HEIGHT: 67 IN | BODY MASS INDEX: 27 KG/M2 | WEIGHT: 172 LBS

## 2021-11-17 DIAGNOSIS — R94.131 ABNORMAL EMG: ICD-10-CM

## 2021-11-17 DIAGNOSIS — R20.0 NUMBNESS AND TINGLING IN BOTH HANDS: Primary | ICD-10-CM

## 2021-11-17 DIAGNOSIS — R20.2 NUMBNESS AND TINGLING IN BOTH HANDS: Primary | ICD-10-CM

## 2021-11-17 PROCEDURE — 95886 MUSC TEST DONE W/N TEST COMP: CPT | Performed by: PHYSICAL MEDICINE & REHABILITATION

## 2021-11-17 PROCEDURE — 95912 NRV CNDJ TEST 11-12 STUDIES: CPT | Performed by: PHYSICAL MEDICINE & REHABILITATION

## 2021-11-17 NOTE — PROGRESS NOTES
[address]  [address], [address]  [place], [address] [place] [address] Street  Phone: [phone]  Fax: [phone]        Latasha Nino  : 1955  PCP: Francis Bowden MD  2021    ELECTROMYOGRAPHY AND NERVE CONDUCTION STUDIES    Remi Saavedra was referred by Dr. Tati Izquierdo for electrodiagnostic evaluation of bilateral hand numbness and tingling. NCV & EMG Findings:  Evaluation of the right median sensory nerve showed decreased conduction velocity (Wrist-2nd Digit, 36 m/s). The left ulnar sensory nerve showed decreased conduction velocity (Wrist-5th Digit, 37 m/s). The right ulnar sensory nerve showed prolonged distal peak latency (4.1 ms) and decreased conduction velocity (Wrist-5th Digit, 34 m/s). The right median/ulnar (dig IV) comparison nerve showed prolonged distal peak latency (Median Wr, 3.7 ms). All remaining nerves (as indicated in the following tables) were within normal limits. Left vs. Right side comparison data for the median sensory nerve indicates abnormal L-R latency difference (0.5 ms). All remaining left vs. right side differences were within normal limits. All examined muscles (as indicated in the following table) showed no evidence of electrical instability. INTERPRETATION    This is an abnormal electrodiagnostic examination. These findings may be consistent with:   1. Sensorimotor peripheral polyneuropathy - this is based on mild abnormalities and borderline values throughout her NCS      CLINICAL INTERPRETATION  Her electrodiagnostic findings consistent with peripheral neuropathy are consistent with her bilateral hand symptoms. This is most likely clinically related to previous alcoholism and B12 deficiency s/p gastric bypass. PLAN  1. Wear CTS brace at night as needed. 2. Maintain HEP. Pt will f/u in 6 months or sooner if needed. HISTORY OF PRESENT ILLNESS  Remi Saavedra is a 61 y.o. female.     Pt presents today for E EMG evaluation of bilateral hand numbness and tingling. She reports nerve damage in the right thumb from prior injury and surgery. She sometimes drops things. Pt reports h/o neuropathy due to B12 deficiency related to a gastric bypass and has a h/o alcoholism. She has found benefit with Amitriptyline. She reports a pain in the left lateral thigh when she is getting into/out of a vehicle. PAST MEDICAL HISTORY   Past Medical History:   Diagnosis Date    DJD (degenerative joint disease) 2/10/2010    DVT (deep venous thrombosis) ([facility] [address] 2/10/2010    Environmental allergies 2/10/2010    HNP (herniated nucleus pulposus), cervical 2/10/2010    Neuropathy 2/10/2010    Osteopenia 2/10/2010    Pernicious anemia 2/10/2010    Vitamin D deficiency 2/10/2010       Past Surgical History:   Procedure Laterality Date    HX CHOLECYSTECTOMY      gastric bypass 2001    HX UROLOGICAL      kidney stone removal   .      MEDICATIONS    Current Outpatient Medications   Medication Sig Dispense Refill    cyclobenzaprine (FLEXERIL) 5 mg tablet Take 5 mg by mouth.  omeprazole (PRILOSEC) 10 mg capsule Take 10 mg by mouth daily.  buPROPion (WELLBUTRIN) 75 mg tablet Take  by mouth two (2) times a day.  traMADol (ULTRAM) 50 mg tablet Take 50 mg by mouth every six (6) hours as needed for Pain.  HYDROcodone-acetaminophen (NORCO) 5-325 mg per tablet Take 1 Tab by mouth every four (4) hours as needed for Pain. Max Daily Amount: 6 Tabs. (Patient not taking: Reported on 10/28/2021) 6 Tab 0    ergocalciferol (ERGOCALCIFEROL) 50,000 unit capsule Take 1 Cap by mouth every Sunday. 4 Cap 4    mometasone (NASONEX) 50 mcg/Actuation nasal spray 2 Sprays by Nasal route daily. (Patient not taking: Reported on 10/28/2021) 1 Container 2    cyanocobalamin (NEURO B-12 FORTE S) 1,000 mcg/mL injection 1,000 mcg by IntraMUSCular route every thirty (30) days.       calcium-vitamin D (OYSTER SHELL) 500 mg(1,250mg) -200 unit per tablet Take 1 Tab by mouth two (2) times daily (with meals).  multivitamin, stress formula (STRESS TAB) tablet Take 1 Tab by mouth daily.  alendronate (FOSAMAX) 70 mg tablet Take 70 mg by mouth Every Saturday. (Patient not taking: Reported on 10/28/2021)          ALLERGIES  Allergies   Allergen Reactions    Codeine Nausea and Vomiting          SOCIAL HISTORY    Social History     Socioeconomic History    Marital status:    Tobacco Use    Smoking status: Never Smoker    Smokeless tobacco: Never Used   Substance and Sexual Activity    Alcohol use: No    Drug use: No       FAMILY HISTORY  Family History   Problem Relation Age of Onset    Hypertension Mother     Cancer Mother         breast    Breast Cancer Mother 68         PHYSICAL EXAMINATION  Visit Vitals  BP (!) 124/58 (BP 1 Location: Left upper arm, BP Patient Position: Sitting, BP Cuff Size: Large adult) Comment: pt asymptomatic, MD aware   Pulse 94   Temp 97.3 °F (36.3 °C) (Temporal)   Ht 5' 6.5\" (1.689 m)   Wt 172 lb (78 kg)   SpO2 100% Comment: RA   BMI 27.35 kg/m²       Pain Assessment  11/17/2021   Location of Pain -   Location Modifiers -   Severity of Pain 0   Quality of Pain Other (Comment)   Quality of Pain Comment n/t to bilateral hands, weakness sometime   Duration of Pain Persistent   Frequency of Pain Constant   Date Pain First Started -   Aggravating Factors Other (Comment)   Aggravating Factors Comment nothing   Limiting Behavior Yes   Relieving Factors Other (Comment)   Relieving Factors Comment elavil, tramadol   Result of Injury No   Work-Related Injury -   Type of Injury -           Constitutional:  Well developed, well nourished, in no acute distress. Psychiatric: Affect and mood are appropriate. Integumentary: No rashes or abrasions noted on exposed areas.         SPINE/MUSCULOSKELETAL EXAM    On brief examination: Painful activation of left hip flexor       NCV & EMG Findings:  Nerve Conduction Studies  Anti Sensory Summary Table     Stim Site NR Peak (ms) Norm Peak (ms) O-P Amp (µV) Norm O-P Amp Site1 Site2 Delta-P (ms) Dist (cm) Judah (m/s) Norm Judah (m/s)   Left Median Anti Sensory (2nd Digit)   Wrist    3.4 <4 33.1 >13 Wrist 2nd Digit 3.4 14.0 41 >39   Right Median Anti Sensory (2nd Digit)   Wrist    3.9 <4 19.7 >13 Wrist 2nd Digit 3.9 14.0 36 >39   Left Radial Anti Sensory (Base 1st Digit)   Wrist    2.5 2.8 36.3 11 Wrist Base 1st Digit 2.5 10.0 40    Right Radial Anti Sensory (Base 1st Digit)   Wrist    2.6 2.8 17.8 11 Wrist Base 1st Digit 2.6 10.0 38    Left Ulnar Anti Sensory (5th Digit)   Wrist    3.8 <4.0 22.5 >9 Wrist 5th Digit 3.8 14.0 37 >38   Right Ulnar Anti Sensory (5th Digit)   Wrist    4.1 <4.0 23.4 >9 Wrist 5th Digit 4.1 14.0 34 >38     Motor Summary Table     Stim Site NR Onset (ms) Norm Onset (ms) O-P Amp (mV) Norm O-P Amp Site1 Site2 Delta-0 (ms) Dist (cm) Judah (m/s) Norm Judah (m/s)   Left Median Motor (Abd Poll Brev)   Wrist    4.1 <4.5 12.4 >4.1 Elbow Wrist 3.4 19.0 56 >49   Elbow    7.5  10.0          Right Median Motor (Abd Poll Brev)   Wrist    4.5 <4.5 13.0 >4.1 Elbow Wrist 3.4 19.5 57 >49   Elbow    7.9  10.2          Left Ulnar Motor (Abd Dig Min)   Wrist    3.1 <3.7 11.3 >7.9 B Elbow Wrist 3.2 17.5 55 >52   B Elbow    6.3  11.3  A Elbow B Elbow 2.3 10.0 43 >43   A Elbow    8.6  10.8          Right Ulnar Motor (Abd Dig Min)   Wrist    3.4 <3.7 11.8 >7.9 B Elbow Wrist 3.3 17.5 53 >52   B Elbow    6.7  11.1  A Elbow B Elbow 2.1 10.0 48 >43   A Elbow    8.8  10.6            Comparison Summary Table     Stim Site NR Peak (ms) Norm Peak (ms) O-P Amp (µV) Site1 Site2 Delta-P (ms) Norm Delta (ms)   Right Median/Ulnar Dig IV Comparison (Digit 4 - 14cm)   Median Wr    3.7 <3.3 19.2 Median Wr Ulnar Wr 0.4 <0.4   Ulnar Wr    3.3 <3.3 3.5         EMG     Side Muscle Nerve Root Ins Act Fibs Psw Amp Dur Poly Recrt Int Pat Comment   Right Biceps Musculocut C5-6 Nml Nml Nml Nml Nml 0 Nml Nml Right Triceps Radial C6-7-8 Nml Nml Nml Nml Nml 0 Nml Nml    Right PronatorTeres Median C6-7 Nml Nml Nml Nml Nml 0 Nml Nml    Right Abd Poll Brev Median C8-T1 Nml Nml Nml Nml Nml 0 Nml Nml    Right 1stDorInt Ulnar C8-T1 Nml Nml Nml Nml Nml 0 Nml Nml    Left Biceps Musculocut C5-6 Nml Nml Nml Nml Nml 0 Nml Nml    Left Triceps Radial C6-7-8 Nml Nml Nml Nml Nml 0 Nml Nml    Left PronatorTeres Median C6-7 Nml Nml Nml Nml Nml 0 Nml Nml    Left Abd Poll Brev Median C8-T1 Nml Nml Nml Nml Nml 0 Nml Nml    Left 1stDorInt Ulnar C8-T1 Nml Nml Nml Nml Nml 0 Nml Nml        Nerve Conduction Studies  Anti Sensory Left/Right Comparison     Stim Site L Lat (ms) R Lat (ms) L-R Lat (ms) L Amp (µV) R Amp (µV) L-R Amp (%) Site1 Site2 L Judah (m/s) R Judah (m/s) L-R Judah (m/s)   Median Anti Sensory (2nd Digit)   Wrist 3.4 3.9 0.5 33.1 19.7 40.5 Wrist 2nd Digit 41 36 5   Radial Anti Sensory (Base 1st Digit)   Wrist 2.5 2.6 0.1 36.3 17.8 51.0 Wrist Base 1st Digit 40 38 2   Ulnar Anti Sensory (5th Digit)   Wrist 3.8 4.1 0.3 22.5 23.4 3.8 Wrist 5th Digit 37 34 3     Motor Left/Right Comparison     Stim Site L Lat (ms) R Lat (ms) L-R Lat (ms) L Amp (mV) R Amp (mV) L-R Amp (%) Site1 Site2 L Judah (m/s) R Judah (m/s) L-R Judah (m/s)   Median Motor (Abd Poll Brev)   Wrist 4.1 4.5 0.4 12.4 13.0 4.6 Elbow Wrist 56 57 1   Elbow 7.5 7.9 0.4 10.0 10.2 2.0        Ulnar Motor (Abd Dig Min)   Wrist 3.1 3.4 0.3 11.3 11.8 4.2 B Elbow Wrist 55 53 2   B Elbow 6.3 6.7 0.4 11.3 11.1 1.8 A Elbow B Elbow 43 48 5   A Elbow 8.6 8.8 0.2 10.8 10.6 1.9          Comparison Left/Right Comparison     Stim Site L Lat (ms) R Lat (ms) L-R Lat (ms) L Amp (µV) R Amp (µV) L-R Amp (%)   Median/Ulnar Dig IV Comparison (Digit 4 - 14cm)   Median Wr  3.7   19.2    Ulnar Wr  3.3   3.5          Waveforms:                                   [facility]  OFFICE PROCEDURE PROGRESS NOTE        Chart reviewed for the following:   IPierre, have reviewed the History, Physical and updated the Allergic reactions for [place] performed immediately prior to start of procedure:   I, Angel Royal, have performed the following reviews on Jeanette Odom prior to the start of the procedure:            * Patient was identified by name and date of birth   * Agreement on procedure being performed was verified  * Risks and Benefits explained to the patient  * Procedure site verified and marked as necessary  * Patient was positioned for comfort  * Consent was signed and verified     Time: 3:36 PM    Date of procedure: 11/17/2021    Procedure performed by:  Pierre Garg MD    Provider accompanied by: Vaibhav. Patient accompanied by: Self.     How tolerated by patient: tolerated the procedure well with no complications    Post Procedural Pain Scale: 0 - No Hurt    Comments: none    Written by Teddy Baez, [address] as dictated by Angel Royal MD

## 2021-11-17 NOTE — PROGRESS NOTES
Silva Reynoso presents today for   Chief Complaint   Patient presents with    Procedure     EMG BUE       Is someone accompanying this pt? no    Is the patient using any DME equipment during OV? no    Depression Screening:  3 most recent PHQ Screens 11/20/2019   PHQ Not Done Active Diagnosis of Depression or Bipolar Disorder   Little interest or pleasure in doing things Not at all   Feeling down, depressed, irritable, or hopeless Not at all   Total Score PHQ 2 0   Trouble falling or staying asleep, or sleeping too much -   Feeling tired or having little energy -   Poor appetite, weight loss, or overeating -   Feeling bad about yourself - or that you are a failure or have let yourself or your family down -   Trouble concentrating on things such as school, work, reading, or watching TV -   Moving or speaking so slowly that other people could have noticed; or the opposite being so fidgety that others notice -   Thoughts of being better off dead, or hurting yourself in some way -   PHQ 9 Score -   How difficult have these problems made it for you to do your work, take care of your home and get along with others -       Coordination of Care:  1. Have you been to the ER, urgent care clinic since your last visit? no  Hospitalized since your last visit? no    2. Have you seen or consulted any other health care providers outside of the [address] [place] [address] since your last visit? no Include any pap smears or colon screening.  no

## 2023-06-22 NOTE — PROGRESS NOTES
Chief Complaint   Patient presents with    Back Pain       Pt preferred language for health care discussion is english. Is someone accompanying this pt? no    Is the patient using any DME equipment during OV? no    Depression Screening:  3 most recent PHQ Screens 11/20/2019 10/2/2019 9/4/2019   PHQ Not Done Active Diagnosis of Depression or Bipolar Disorder - -   Little interest or pleasure in doing things Not at all Nearly every day Several days   Feeling down, depressed, irritable, or hopeless Not at all Nearly every day Several days   Total Score PHQ 2 0 6 2   Trouble falling or staying asleep, or sleeping too much - Not at all -   Feeling tired or having little energy - Not at all -   Poor appetite, weight loss, or overeating - Not at all -   Feeling bad about yourself - or that you are a failure or have let yourself or your family down - Nearly every day -   Trouble concentrating on things such as school, work, reading, or watching TV - Nearly every day -   Moving or speaking so slowly that other people could have noticed; or the opposite being so fidgety that others notice - Not at all -   Thoughts of being better off dead, or hurting yourself in some way - Not at all -   PHQ 9 Score - 12 -   How difficult have these problems made it for you to do your work, take care of your home and get along with others - Very difficult -       Learning Assessment:  No flowsheet data found. Health Maintenance reviewed and discussed per provider. Yes        Advance Directive:  1. Do you have an advance directive in place? Patient Reply:no    2. If not, would you like material regarding how to put one in place? Patient Reply: no    Coordination of Care:  1. Have you been to the ER, urgent care clinic since your last visit? Hospitalized since your last visit? no    2. Have you seen or consulted any other health care providers outside of the [address] [place] [address] since your last visit?  Include any pap smears or Complete labs today.     Complete labs 1-2 days prior to next appointment   colon screening.  no

## 2024-02-15 NOTE — PROGRESS NOTES
mouth 2 times daily (with meals)      cyanocobalamin 1000 MCG/ML injection Inject 1 mL into the muscle every 30 days      cyclobenzaprine (FLEXERIL) 5 MG tablet Take 1 tablet by mouth      ergocalciferol (ERGOCALCIFEROL) 1.25 MG (70718 UT) capsule Take 1 capsule by mouth      omeprazole (PRILOSEC) 10 MG delayed release capsule Take 1 capsule by mouth daily      traMADol (ULTRAM) 50 MG tablet Take 1 tablet by mouth every morning.      Ferrous Sulfate (IRON PO) Take 1 tablet by mouth daily      alendronate (FOSAMAX) 70 MG tablet Take 70 mg by mouth (Patient not taking: Reported on 2/20/2024)      HYDROcodone-acetaminophen (NORCO) 5-325 MG per tablet Take 1 tablet by mouth every 4 hours as needed.      mometasone (NASONEX) 50 MCG/ACT nasal spray 2 sprays by Nasal route daily       No current facility-administered medications for this visit.       ALLERGIES    Allergies   Allergen Reactions    Codeine Nausea And Vomiting and Other (See Comments)          SOCIAL HISTORY    Social History     Socioeconomic History    Marital status:      Spouse name: None    Number of children: None    Years of education: None    Highest education level: None   Tobacco Use    Smoking status: Never    Smokeless tobacco: Never   Substance and Sexual Activity    Alcohol use: No    Drug use: No       FAMILY HISTORY    Family History   Problem Relation Age of Onset    Hypertension Mother     Breast Cancer Mother 76    Cancer Mother         breast         VITALS   /69   Pulse 98   Temp 97.6 °F (36.4 °C) (Temporal)   Resp 16   Ht 1.689 m (5' 6.5\")   Wt 76.7 kg (169 lb 3.2 oz)   BMI 26.90 kg/m²     PAIN ASSESSMENT      2/20/2024    12:09 PM   AMB PAIN ASSESSMENT   Location of Pain Back   Location Modifiers Left   Severity of Pain 6   Quality of Pain Dull;Aching;Other (Comment)   Duration of Pain Persistent   Frequency of Pain Constant   Aggravating Factors Standing;Walking   Limiting Behavior Some   Relieving Factors

## 2024-02-20 ENCOUNTER — OFFICE VISIT (OUTPATIENT)
Age: 69
End: 2024-02-20
Payer: MEDICARE

## 2024-02-20 VITALS
HEART RATE: 98 BPM | BODY MASS INDEX: 26.56 KG/M2 | WEIGHT: 169.2 LBS | SYSTOLIC BLOOD PRESSURE: 134 MMHG | HEIGHT: 67 IN | RESPIRATION RATE: 16 BRPM | DIASTOLIC BLOOD PRESSURE: 69 MMHG | TEMPERATURE: 97.6 F

## 2024-02-20 DIAGNOSIS — M79.18 MYOFASCIAL PAIN: ICD-10-CM

## 2024-02-20 DIAGNOSIS — M25.552 LEFT HIP PAIN: ICD-10-CM

## 2024-02-20 DIAGNOSIS — M48.061 SPINAL STENOSIS OF LUMBAR REGION WITHOUT NEUROGENIC CLAUDICATION: ICD-10-CM

## 2024-02-20 DIAGNOSIS — M54.50 LUMBAR PAIN: Primary | ICD-10-CM

## 2024-02-20 DIAGNOSIS — M47.816 LUMBAR FACET ARTHROPATHY: ICD-10-CM

## 2024-02-20 PROBLEM — G89.29 CHRONIC BACK PAIN: Status: ACTIVE | Noted: 2022-06-02

## 2024-02-20 PROBLEM — K63.5 POLYP OF COLON: Status: ACTIVE | Noted: 2022-10-20

## 2024-02-20 PROBLEM — G89.4 CHRONIC PAIN DISORDER: Status: ACTIVE | Noted: 2017-07-25

## 2024-02-20 PROBLEM — F33.41 RECURRENT MAJOR DEPRESSIVE DISORDER, IN PARTIAL REMISSION (HCC): Status: ACTIVE | Noted: 2017-07-25

## 2024-02-20 PROBLEM — I82.409 DEEP VEIN THROMBOSIS (DVT) OF LOWER EXTREMITY (HCC): Status: ACTIVE | Noted: 2024-02-20

## 2024-02-20 PROBLEM — G47.33 OSA ON CPAP: Status: ACTIVE | Noted: 2023-03-06

## 2024-02-20 PROBLEM — G25.81 RESTLESS LEG SYNDROME: Status: ACTIVE | Noted: 2017-07-25

## 2024-02-20 PROBLEM — M54.9 CHRONIC BACK PAIN: Status: ACTIVE | Noted: 2022-06-02

## 2024-02-20 PROBLEM — I87.2 PERIPHERAL VENOUS INSUFFICIENCY: Status: ACTIVE | Noted: 2024-02-20

## 2024-02-20 PROBLEM — I80.00 SUPERFICIAL THROMBOPHLEBITIS OF LOWER EXTREMITY: Status: ACTIVE | Noted: 2024-02-20

## 2024-02-20 PROBLEM — F32.A CHRONIC DEPRESSION: Status: ACTIVE | Noted: 2022-06-02

## 2024-02-20 PROBLEM — M77.9 TENDONITIS: Status: ACTIVE | Noted: 2024-02-20

## 2024-02-20 PROBLEM — K21.9 GASTROESOPHAGEAL REFLUX DISEASE: Status: ACTIVE | Noted: 2022-08-19

## 2024-02-20 PROCEDURE — G8419 CALC BMI OUT NRM PARAM NOF/U: HCPCS | Performed by: PHYSICAL MEDICINE & REHABILITATION

## 2024-02-20 PROCEDURE — G8399 PT W/DXA RESULTS DOCUMENT: HCPCS | Performed by: PHYSICAL MEDICINE & REHABILITATION

## 2024-02-20 PROCEDURE — G8484 FLU IMMUNIZE NO ADMIN: HCPCS | Performed by: PHYSICAL MEDICINE & REHABILITATION

## 2024-02-20 PROCEDURE — 99214 OFFICE O/P EST MOD 30 MIN: CPT | Performed by: PHYSICAL MEDICINE & REHABILITATION

## 2024-02-20 PROCEDURE — 1036F TOBACCO NON-USER: CPT | Performed by: PHYSICAL MEDICINE & REHABILITATION

## 2024-02-20 PROCEDURE — G8427 DOCREV CUR MEDS BY ELIG CLIN: HCPCS | Performed by: PHYSICAL MEDICINE & REHABILITATION

## 2024-02-20 PROCEDURE — 1123F ACP DISCUSS/DSCN MKR DOCD: CPT | Performed by: PHYSICAL MEDICINE & REHABILITATION

## 2024-02-20 PROCEDURE — 3017F COLORECTAL CA SCREEN DOC REV: CPT | Performed by: PHYSICAL MEDICINE & REHABILITATION

## 2024-02-20 PROCEDURE — 1090F PRES/ABSN URINE INCON ASSESS: CPT | Performed by: PHYSICAL MEDICINE & REHABILITATION

## 2024-02-20 RX ORDER — FLUOXETINE HYDROCHLORIDE 20 MG/1
20 CAPSULE ORAL DAILY
COMMUNITY
Start: 2023-10-02

## 2024-02-20 RX ORDER — ALPRAZOLAM 0.5 MG/1
TABLET ORAL
COMMUNITY
Start: 2023-11-03

## 2024-02-20 RX ORDER — AMITRIPTYLINE HYDROCHLORIDE 50 MG/1
1 TABLET, FILM COATED ORAL NIGHTLY
COMMUNITY
Start: 2023-10-30

## 2024-02-20 RX ORDER — GABAPENTIN 100 MG/1
100 CAPSULE ORAL NIGHTLY
COMMUNITY
Start: 2024-01-09

## 2024-02-20 RX ORDER — BUPROPION HYDROCHLORIDE 300 MG/1
TABLET ORAL
COMMUNITY
Start: 2024-01-29

## 2024-02-20 RX ORDER — CYCLOBENZAPRINE HCL 10 MG
TABLET ORAL
COMMUNITY
Start: 2024-01-29

## 2024-02-20 ASSESSMENT — ENCOUNTER SYMPTOMS
TROUBLE SWALLOWING: 0
SHORTNESS OF BREATH: 0
NAUSEA: 0
BACK PAIN: 1
VOMITING: 0
WHEEZING: 0

## 2024-02-21 ENCOUNTER — HOSPITAL ENCOUNTER (OUTPATIENT)
Facility: HOSPITAL | Age: 69
Discharge: HOME | End: 2024-02-24
Payer: MEDICARE

## 2024-02-21 DIAGNOSIS — M25.552 LEFT HIP PAIN: ICD-10-CM

## 2024-02-21 PROCEDURE — 73502 X-RAY EXAM HIP UNI 2-3 VIEWS: CPT

## 2024-03-25 ENCOUNTER — OFFICE VISIT (OUTPATIENT)
Age: 69
End: 2024-03-25
Payer: MEDICARE

## 2024-03-25 VITALS
HEIGHT: 67 IN | BODY MASS INDEX: 27.15 KG/M2 | SYSTOLIC BLOOD PRESSURE: 138 MMHG | WEIGHT: 173 LBS | DIASTOLIC BLOOD PRESSURE: 68 MMHG | OXYGEN SATURATION: 99 % | TEMPERATURE: 98 F | HEART RATE: 99 BPM

## 2024-03-25 DIAGNOSIS — M48.061 SPINAL STENOSIS OF LUMBAR REGION WITHOUT NEUROGENIC CLAUDICATION: ICD-10-CM

## 2024-03-25 DIAGNOSIS — M25.552 LEFT HIP PAIN: Primary | ICD-10-CM

## 2024-03-25 PROCEDURE — 3017F COLORECTAL CA SCREEN DOC REV: CPT | Performed by: PHYSICAL MEDICINE & REHABILITATION

## 2024-03-25 PROCEDURE — G8419 CALC BMI OUT NRM PARAM NOF/U: HCPCS | Performed by: PHYSICAL MEDICINE & REHABILITATION

## 2024-03-25 PROCEDURE — 1123F ACP DISCUSS/DSCN MKR DOCD: CPT | Performed by: PHYSICAL MEDICINE & REHABILITATION

## 2024-03-25 PROCEDURE — G8427 DOCREV CUR MEDS BY ELIG CLIN: HCPCS | Performed by: PHYSICAL MEDICINE & REHABILITATION

## 2024-03-25 PROCEDURE — 1036F TOBACCO NON-USER: CPT | Performed by: PHYSICAL MEDICINE & REHABILITATION

## 2024-03-25 PROCEDURE — 99214 OFFICE O/P EST MOD 30 MIN: CPT | Performed by: PHYSICAL MEDICINE & REHABILITATION

## 2024-03-25 PROCEDURE — 1090F PRES/ABSN URINE INCON ASSESS: CPT | Performed by: PHYSICAL MEDICINE & REHABILITATION

## 2024-03-25 PROCEDURE — G8484 FLU IMMUNIZE NO ADMIN: HCPCS | Performed by: PHYSICAL MEDICINE & REHABILITATION

## 2024-03-25 PROCEDURE — G8399 PT W/DXA RESULTS DOCUMENT: HCPCS | Performed by: PHYSICAL MEDICINE & REHABILITATION

## 2024-03-25 NOTE — PROGRESS NOTES
Ema Aguilar presents today for   Chief Complaint   Patient presents with    Hip Pain     Left         Is someone accompanying this pt? no    Is the patient using any DME equipment during OV? no      Coordination of Care:  1. Have you been to the ER, urgent care clinic since your last visit? no  Hospitalized since your last visit? no    2. Have you seen or consulted any other health care providers outside of the [facility] since your last visit? no Include any pap smears or colon screening. no

## 2024-03-25 NOTE — PROGRESS NOTES
[facility]  [address] [place] [address], [address]  [place] [address]  Phone: [phone]  Fax: [phone]      Patient: Ema Aguilar                                                                              MRN: [id number]        YOB: 1955          AGE: 69 y.o.             PCP: Marlee Bird MD  Date:  03/25/24    Reason for Consultation: Hip Pain (Left/)      HPI:  Ema Aguilar is a 69 y.o. female with relevant PMH of spinal stenosis, osteoporosis on prolia, gastric bypass in 2002 , neuropathy due to B12 deficiency,  who presents with low back and left hip pain.  12/30/2023- she had a fall onto her right hip.  She had severe low back and bilateral hip pain. She  went to the ED and had a CT and x-ray of the right hip which were unremarkable.  Pain has localized to her low back and radiates down her left anterior thigh.  She saw Dr Colon who got an x-ray of her hip which is unremarkable and referred her for further evaluation.  She has trouble lifting her left hip.      Ambulates with cane    Neurologic symptoms:  numbness, tingling bilateral hands.  + weakness left leg,  bowel or bladder changes.     Location: The pain is located in the left low back    Radiation: The pain does radiate towards the knee .    Pain Score: Currently: 3/10  Quality: Pain is of a stabbing, pulling quality.    Aggravating: Pain is exacerbated by walking and standing  Alleviating: The pain is alleviated by sitting    Prior Treatments:  Physical therapy: No  Injections:No  Surgery:No  Previous Medications: hydrocodone   Current Medications: gabapentin 100mg,  elavil 50mg , tramadol, tylenol   Previous work-up has included:   X-ray left hip- 2/21/2024- unremarkable  MRI lumbar spine 9/202/2021  L1/2 level: Unremarkable.     L2/3 level: Mild facet arthropathy with no disc abnormality or significant stenosis.     L3/4 level: Mild facet arthropathy with no disc

## 2024-04-17 NOTE — PROGRESS NOTES
- this is based on mild abnormalities and borderline values throughout her NCS         CLINICAL INTERPRETATION   Her electrodiagnostic findings consistent with peripheral neuropathy are consistent with her bilateral hand symptoms. This is most likely clinically related to previous alcoholism  and B12 deficiency s/p gastric bypass.       *** minutes of face-to-face contact were spent with the patient during today's visit extensively discussing symptoms and treatment plan.  All questions were answered. More than half of this visit today was spent on counseling.      Written by Rebeca Valdivia, as dictated by Dr. Hector Colon.

## 2024-05-01 ENCOUNTER — OFFICE VISIT (OUTPATIENT)
Age: 69
End: 2024-05-01
Payer: MEDICARE

## 2024-05-01 VITALS
OXYGEN SATURATION: 100 % | HEIGHT: 67 IN | HEART RATE: 87 BPM | RESPIRATION RATE: 16 BRPM | BODY MASS INDEX: 26.37 KG/M2 | WEIGHT: 168 LBS

## 2024-05-01 DIAGNOSIS — M79.18 MYOFASCIAL PAIN: ICD-10-CM

## 2024-05-01 DIAGNOSIS — M48.062 SPINAL STENOSIS OF LUMBAR REGION WITH NEUROGENIC CLAUDICATION: Primary | ICD-10-CM

## 2024-05-01 DIAGNOSIS — M25.552 LEFT HIP PAIN: ICD-10-CM

## 2024-05-01 DIAGNOSIS — M47.816 LUMBAR FACET ARTHROPATHY: ICD-10-CM

## 2024-05-01 DIAGNOSIS — M54.50 LUMBAR PAIN: ICD-10-CM

## 2024-05-01 PROCEDURE — 1036F TOBACCO NON-USER: CPT | Performed by: PHYSICAL MEDICINE & REHABILITATION

## 2024-05-01 PROCEDURE — 1123F ACP DISCUSS/DSCN MKR DOCD: CPT | Performed by: PHYSICAL MEDICINE & REHABILITATION

## 2024-05-01 PROCEDURE — 1090F PRES/ABSN URINE INCON ASSESS: CPT | Performed by: PHYSICAL MEDICINE & REHABILITATION

## 2024-05-01 PROCEDURE — G8427 DOCREV CUR MEDS BY ELIG CLIN: HCPCS | Performed by: PHYSICAL MEDICINE & REHABILITATION

## 2024-05-01 PROCEDURE — 99213 OFFICE O/P EST LOW 20 MIN: CPT | Performed by: PHYSICAL MEDICINE & REHABILITATION

## 2024-05-01 PROCEDURE — G8399 PT W/DXA RESULTS DOCUMENT: HCPCS | Performed by: PHYSICAL MEDICINE & REHABILITATION

## 2024-05-01 PROCEDURE — 3017F COLORECTAL CA SCREEN DOC REV: CPT | Performed by: PHYSICAL MEDICINE & REHABILITATION

## 2024-05-01 PROCEDURE — G8419 CALC BMI OUT NRM PARAM NOF/U: HCPCS | Performed by: PHYSICAL MEDICINE & REHABILITATION

## 2024-05-01 ASSESSMENT — ENCOUNTER SYMPTOMS
TROUBLE SWALLOWING: 0
VOMITING: 0
WHEEZING: 0
NAUSEA: 0
BACK PAIN: 1
SHORTNESS OF BREATH: 0

## 2024-05-01 NOTE — PROGRESS NOTES
5/1/2024    10:48 AM   AMB PAIN ASSESSMENT   Location of Pain Back   Location Modifiers Left   Severity of Pain 7   Quality of Pain Aching   Duration of Pain Persistent   Frequency of Pain Constant   Aggravating Factors Other (Comment);Walking;Standing   Limiting Behavior No   Relieving Factors Other (Comment);Rest;Exercise   Result of Injury Yes   Work-Related Injury No          RADIOGRAPHS/DATA  Left Hip XR XR images taken on 2/21/24 personally reviewed with patient:  FINDINGS:        FRACTURE:  No acute fracture or dislocation. No radiopaque foreign body.        ALIGNMENT:  Unremarkable.        DEGENERATIVE CHANGES:  None significant detected.        SOFT TISSUES:  Unremarkable.        HARDWARE:  None.     _______________     IMPRESSION:     No acute fracture or dislocation.     BUE EMG on 11/17/21 by Dr. Colon:   NCV & EMG Findings:   Evaluation of the right median sensory nerve showed decreased conduction velocity (Wrist-2nd Digit, 36 m/s).  The left ulnar sensory nerve  showed decreased conduction velocity (Wrist-5th Digit, 37 m/s).  The right ulnar sensory nerve showed prolonged distal peak latency (4.1 ms) and decreased conduction velocity (Wrist-5th Digit, 34 m/s).  The right median/ulnar (dig IV) comparison nerve  showed prolonged distal peak latency (Median Wr, 3.7 ms).  All remaining nerves (as indicated in the following tables) were within normal limits.  Left vs. Right side comparison data for the median sensory nerve indicates abnormal L-R latency difference  (0.5 ms).  All remaining left vs. right side differences were within normal limits.        All examined muscles (as indicated in the following table) showed no evidence of electrical instability.        INTERPRETATION      This is an abnormal electrodiagnostic examination. These findings may  be consistent with:    1. Sensorimotor peripheral polyneuropathy - this is based on mild abnormalities and borderline values throughout her NCS

## 2024-06-03 ENCOUNTER — HOSPITAL ENCOUNTER (OUTPATIENT)
Facility: HOSPITAL | Age: 69
Discharge: HOME | End: 2024-06-06
Attending: PHYSICAL MEDICINE & REHABILITATION
Payer: MEDICARE

## 2024-06-03 DIAGNOSIS — M54.50 LUMBAR PAIN: ICD-10-CM

## 2024-06-03 DIAGNOSIS — M25.552 LEFT HIP PAIN: ICD-10-CM

## 2024-06-03 DIAGNOSIS — M79.18 MYOFASCIAL PAIN: ICD-10-CM

## 2024-06-03 DIAGNOSIS — M47.816 LUMBAR FACET ARTHROPATHY: ICD-10-CM

## 2024-06-03 DIAGNOSIS — M48.062 SPINAL STENOSIS OF LUMBAR REGION WITH NEUROGENIC CLAUDICATION: ICD-10-CM

## 2024-06-03 PROCEDURE — 72148 MRI LUMBAR SPINE W/O DYE: CPT

## 2024-07-31 ENCOUNTER — OFFICE VISIT (OUTPATIENT)
Age: 69
End: 2024-07-31
Payer: MEDICARE

## 2024-07-31 VITALS
HEIGHT: 67 IN | BODY MASS INDEX: 26.71 KG/M2 | SYSTOLIC BLOOD PRESSURE: 116 MMHG | RESPIRATION RATE: 16 BRPM | HEART RATE: 106 BPM | DIASTOLIC BLOOD PRESSURE: 71 MMHG

## 2024-07-31 DIAGNOSIS — M25.552 LEFT HIP PAIN: ICD-10-CM

## 2024-07-31 DIAGNOSIS — M48.062 SPINAL STENOSIS OF LUMBAR REGION WITH NEUROGENIC CLAUDICATION: ICD-10-CM

## 2024-07-31 DIAGNOSIS — M54.50 LUMBAR PAIN: ICD-10-CM

## 2024-07-31 DIAGNOSIS — M47.816 LUMBAR FACET ARTHROPATHY: ICD-10-CM

## 2024-07-31 DIAGNOSIS — S32.030A COMPRESSION FRACTURE OF L3 LUMBAR VERTEBRA, CLOSED, INITIAL ENCOUNTER (HCC): Primary | ICD-10-CM

## 2024-07-31 DIAGNOSIS — M79.18 MYOFASCIAL PAIN: ICD-10-CM

## 2024-07-31 PROCEDURE — 1090F PRES/ABSN URINE INCON ASSESS: CPT | Performed by: PHYSICAL MEDICINE & REHABILITATION

## 2024-07-31 PROCEDURE — G8419 CALC BMI OUT NRM PARAM NOF/U: HCPCS | Performed by: PHYSICAL MEDICINE & REHABILITATION

## 2024-07-31 PROCEDURE — 99214 OFFICE O/P EST MOD 30 MIN: CPT | Performed by: PHYSICAL MEDICINE & REHABILITATION

## 2024-07-31 PROCEDURE — G8399 PT W/DXA RESULTS DOCUMENT: HCPCS | Performed by: PHYSICAL MEDICINE & REHABILITATION

## 2024-07-31 PROCEDURE — G8427 DOCREV CUR MEDS BY ELIG CLIN: HCPCS | Performed by: PHYSICAL MEDICINE & REHABILITATION

## 2024-07-31 PROCEDURE — 1123F ACP DISCUSS/DSCN MKR DOCD: CPT | Performed by: PHYSICAL MEDICINE & REHABILITATION

## 2024-07-31 PROCEDURE — 1036F TOBACCO NON-USER: CPT | Performed by: PHYSICAL MEDICINE & REHABILITATION

## 2024-07-31 PROCEDURE — 3017F COLORECTAL CA SCREEN DOC REV: CPT | Performed by: PHYSICAL MEDICINE & REHABILITATION

## 2024-07-31 ASSESSMENT — ENCOUNTER SYMPTOMS
BACK PAIN: 1
TROUBLE SWALLOWING: 0
NAUSEA: 0
WHEEZING: 0
VOMITING: 0
SHORTNESS OF BREATH: 0

## 2024-11-26 NOTE — PROGRESS NOTES
conduction velocity (Wrist-2nd Digit, 36 m/s).  The left ulnar sensory nerve  showed decreased conduction velocity (Wrist-5th Digit, 37 m/s).  The right ulnar sensory nerve showed prolonged distal peak latency (4.1 ms) and decreased conduction velocity (Wrist-5th Digit, 34 m/s).  The right median/ulnar (dig IV) comparison nerve  showed prolonged distal peak latency (Median Wr, 3.7 ms).  All remaining nerves (as indicated in the following tables) were within normal limits.  Left vs. Right side comparison data for the median sensory nerve indicates abnormal L-R latency difference  (0.5 ms).  All remaining left vs. right side differences were within normal limits.        All examined muscles (as indicated in the following table) showed no evidence of electrical instability.        INTERPRETATION      This is an abnormal electrodiagnostic examination. These findings may  be consistent with:    1. Sensorimotor peripheral polyneuropathy - this is based on mild abnormalities and borderline values throughout her NCS         CLINICAL INTERPRETATION   Her electrodiagnostic findings consistent with peripheral neuropathy are consistent with her bilateral hand symptoms. This is most likely clinically related to previous alcoholism  and B12 deficiency s/p gastric bypass.      Lumbar MRI images taken on 9/18/21 were personally reviewed with patient:  FINDINGS:     There is mild anterior listhesis L4/5 and L5/S1. There is horizontal fracture line through the inferior aspect T11 vertebral body extending to the posterior wall with very slight posterior bowing of the wall, with moderate anterior and central loss of height and extensive adjacent marrow edema within the vertebral body. No significant canal stenosis at this level. The degree of vertebral body height loss is stable since recent plain film. There are chronic appearing superior endplate fractures L1 and L2 vertebral bodies with moderate loss of height L1 and mild to

## 2024-12-04 ENCOUNTER — OFFICE VISIT (OUTPATIENT)
Age: 69
End: 2024-12-04
Payer: MEDICARE

## 2024-12-04 VITALS
BODY MASS INDEX: 27.84 KG/M2 | RESPIRATION RATE: 16 BRPM | HEIGHT: 67 IN | WEIGHT: 177.4 LBS | DIASTOLIC BLOOD PRESSURE: 80 MMHG | HEART RATE: 81 BPM | SYSTOLIC BLOOD PRESSURE: 136 MMHG | TEMPERATURE: 97.8 F

## 2024-12-04 DIAGNOSIS — G89.29 CHRONIC PRIMARY MUSCULOSKELETAL PAIN: ICD-10-CM

## 2024-12-04 DIAGNOSIS — M47.816 LUMBAR FACET ARTHROPATHY: ICD-10-CM

## 2024-12-04 DIAGNOSIS — S32.030D COMPRESSION FRACTURE OF L3 VERTEBRA WITH ROUTINE HEALING, SUBSEQUENT ENCOUNTER: ICD-10-CM

## 2024-12-04 DIAGNOSIS — M79.18 CHRONIC PRIMARY MUSCULOSKELETAL PAIN: ICD-10-CM

## 2024-12-04 DIAGNOSIS — M48.061 SPINAL STENOSIS OF LUMBAR REGION WITHOUT NEUROGENIC CLAUDICATION: Primary | ICD-10-CM

## 2024-12-04 DIAGNOSIS — M54.50 LUMBAR PAIN: ICD-10-CM

## 2024-12-04 PROCEDURE — 1123F ACP DISCUSS/DSCN MKR DOCD: CPT | Performed by: PHYSICAL MEDICINE & REHABILITATION

## 2024-12-04 PROCEDURE — 3017F COLORECTAL CA SCREEN DOC REV: CPT | Performed by: PHYSICAL MEDICINE & REHABILITATION

## 2024-12-04 PROCEDURE — 1036F TOBACCO NON-USER: CPT | Performed by: PHYSICAL MEDICINE & REHABILITATION

## 2024-12-04 PROCEDURE — 1125F AMNT PAIN NOTED PAIN PRSNT: CPT | Performed by: PHYSICAL MEDICINE & REHABILITATION

## 2024-12-04 PROCEDURE — G8399 PT W/DXA RESULTS DOCUMENT: HCPCS | Performed by: PHYSICAL MEDICINE & REHABILITATION

## 2024-12-04 PROCEDURE — 99213 OFFICE O/P EST LOW 20 MIN: CPT | Performed by: PHYSICAL MEDICINE & REHABILITATION

## 2024-12-04 PROCEDURE — 1160F RVW MEDS BY RX/DR IN RCRD: CPT | Performed by: PHYSICAL MEDICINE & REHABILITATION

## 2024-12-04 PROCEDURE — G8427 DOCREV CUR MEDS BY ELIG CLIN: HCPCS | Performed by: PHYSICAL MEDICINE & REHABILITATION

## 2024-12-04 PROCEDURE — G8419 CALC BMI OUT NRM PARAM NOF/U: HCPCS | Performed by: PHYSICAL MEDICINE & REHABILITATION

## 2024-12-04 PROCEDURE — 1090F PRES/ABSN URINE INCON ASSESS: CPT | Performed by: PHYSICAL MEDICINE & REHABILITATION

## 2024-12-04 PROCEDURE — G8484 FLU IMMUNIZE NO ADMIN: HCPCS | Performed by: PHYSICAL MEDICINE & REHABILITATION

## 2024-12-04 PROCEDURE — 1159F MED LIST DOCD IN RCRD: CPT | Performed by: PHYSICAL MEDICINE & REHABILITATION

## 2024-12-04 ASSESSMENT — ENCOUNTER SYMPTOMS
WHEEZING: 0
NAUSEA: 0
BACK PAIN: 1
TROUBLE SWALLOWING: 0
VOMITING: 0
SHORTNESS OF BREATH: 0

## 2024-12-20 ENCOUNTER — CLINICAL DOCUMENTATION (OUTPATIENT)
Age: 69
End: 2024-12-20

## 2024-12-20 NOTE — PROGRESS NOTES
Message from [facility] regarding yesterday's injection being cancelled stated patient cancelled due to copay.  Left voicemail requesting call back if patient wishes to reschedule for [place].

## 2025-04-29 NOTE — PROGRESS NOTES
[facility]  [facility] [facility], [address]  [place] [address]  Phone: [phone]  Fax: [phone]      Ema Aguilar  : 1955  PCP: Marlee Bird MD  2025    PROGRESS NOTE    HISTORY OF PRESENT ILLNESS      Seen as a new patient on 10/28/21 c/o low back pain radiating into the buttocks (back > buttocks).  She has worn a back brace  at work with some benefit.   Muscle relaxants provide benefit.  Lumbar spine  MRI dated 21 reviewed. Per report, Subacute benign-appearing compression fracture T11 vertebral body with moderate loss of  height, without significant stenosis at this level. Chronic compression fractures L1 and L2 vertebral bodies. Severe acquired spinal canal stenosis L4/5 with moderate to severe bilateral subarticular foraminal stenosis. Marrow edema S2 and S3 sacral body  likely related to acute/subacute sacral insufficiency fracture. This can be further evaluated with CT sacrum.   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   Elavil at night provides benefit  She also takes Tramadol  prescribed by her PCP.   Also c/o bilateral hand numbness and tingling. She reports nerve damage in the right thumb from prior injury and surgery. She sometimes drops things.   BUE EMG performed by Dr. Colon on 21 revealed findings suggestive of sensorimotor peripheral polyneuropathy   Reports h/o neuropathy due to B12 deficiency related to a  gastric bypass and has a h/o alcoholism  Amitriptyline provided benefit  She reports a pain in the left lateral thigh when she is getting into/out of a vehicle.      24  C/o bilateral buttock and lumbar pain. Onset of sxs started on 24 after she had fallen on her right side due to an episode of dizziness.   Seen by ER on 23, where they dx'd cause of dizziness to hyponatremia.  She also notes intermittent left hip pain radiating down the front of her

## 2025-04-30 ENCOUNTER — OFFICE VISIT (OUTPATIENT)
Age: 70
End: 2025-04-30
Payer: MEDICARE

## 2025-04-30 VITALS
BODY MASS INDEX: 26.49 KG/M2 | TEMPERATURE: 97.4 F | SYSTOLIC BLOOD PRESSURE: 137 MMHG | RESPIRATION RATE: 18 BRPM | HEIGHT: 67 IN | WEIGHT: 168.8 LBS | HEART RATE: 83 BPM | DIASTOLIC BLOOD PRESSURE: 70 MMHG

## 2025-04-30 DIAGNOSIS — G89.29 CHRONIC PRIMARY MUSCULOSKELETAL PAIN: ICD-10-CM

## 2025-04-30 DIAGNOSIS — M79.18 CHRONIC PRIMARY MUSCULOSKELETAL PAIN: ICD-10-CM

## 2025-04-30 DIAGNOSIS — M54.50 LUMBAR PAIN: ICD-10-CM

## 2025-04-30 DIAGNOSIS — M47.816 LUMBAR FACET ARTHROPATHY: ICD-10-CM

## 2025-04-30 DIAGNOSIS — M48.061 SPINAL STENOSIS OF LUMBAR REGION WITHOUT NEUROGENIC CLAUDICATION: Primary | ICD-10-CM

## 2025-04-30 PROCEDURE — 99213 OFFICE O/P EST LOW 20 MIN: CPT | Performed by: PHYSICAL MEDICINE & REHABILITATION

## 2025-04-30 PROCEDURE — 1159F MED LIST DOCD IN RCRD: CPT | Performed by: PHYSICAL MEDICINE & REHABILITATION

## 2025-04-30 PROCEDURE — 1123F ACP DISCUSS/DSCN MKR DOCD: CPT | Performed by: PHYSICAL MEDICINE & REHABILITATION

## 2025-04-30 PROCEDURE — 1160F RVW MEDS BY RX/DR IN RCRD: CPT | Performed by: PHYSICAL MEDICINE & REHABILITATION

## 2025-04-30 PROCEDURE — 1125F AMNT PAIN NOTED PAIN PRSNT: CPT | Performed by: PHYSICAL MEDICINE & REHABILITATION

## 2025-04-30 ASSESSMENT — ENCOUNTER SYMPTOMS
VOMITING: 0
TROUBLE SWALLOWING: 0
SHORTNESS OF BREATH: 0
BACK PAIN: 1
NAUSEA: 0
WHEEZING: 0

## 2025-07-02 ENCOUNTER — TELEMEDICINE (OUTPATIENT)
Age: 70
End: 2025-07-02

## 2025-07-02 DIAGNOSIS — G89.29 CHRONIC PRIMARY MUSCULOSKELETAL PAIN: ICD-10-CM

## 2025-07-02 DIAGNOSIS — M79.18 CHRONIC PRIMARY MUSCULOSKELETAL PAIN: ICD-10-CM

## 2025-07-02 DIAGNOSIS — M54.50 LUMBAR PAIN: ICD-10-CM

## 2025-07-02 DIAGNOSIS — M48.061 SPINAL STENOSIS OF LUMBAR REGION WITHOUT NEUROGENIC CLAUDICATION: Primary | ICD-10-CM

## 2025-07-02 DIAGNOSIS — M47.816 LUMBAR FACET ARTHROPATHY: ICD-10-CM

## 2025-07-02 ASSESSMENT — ENCOUNTER SYMPTOMS
WHEEZING: 0
SHORTNESS OF BREATH: 0
BACK PAIN: 1
VOMITING: 0
TROUBLE SWALLOWING: 0
NAUSEA: 0

## 2025-07-02 NOTE — PROGRESS NOTES
[facility]  [address] [facility], [address]  [place] [address]  Phone: [phone]  Fax: [phone]        Ema Aguilar  : 1955  PCP: Marlee Bird MD  2025     PROGRESS NOTE     Consent: Ema Aguilar was evaluated through a synchronous (real-time) audio-video encounter. The patient (or guardian if applicable) is aware that this is a billable service, which includes applicable co-pays. This Virtual Visit was conducted with patient's (and/or legal guardian's) consent. She has not had a related appointment within my department in the past 7 days or scheduled within the next 24 hours.     The patient was located at Home: [address] [place] [address]   3  [facility] [id number]  Provider was located at Facility (Appt Dept): [address] [place] [address]. [address]  [place] [address]     The visit was conducted in the office with the patient being in home through a video call platform.  Visit time start: 1:02PM end: 1:08PM    HISTORY OF PRESENT ILLNESS      Seen as a new patient on 10/28/21 c/o low back pain radiating into the buttocks (back > buttocks).  She has worn a back brace  at work with some benefit.   Muscle relaxants provide benefit.  Lumbar spine  MRI dated 21 reviewed. Per report, Subacute benign-appearing compression fracture T11 vertebral body with moderate loss of  height, without significant stenosis at this level. Chronic compression fractures L1 and L2 vertebral bodies. Severe acquired spinal canal stenosis L4/5 with moderate to severe bilateral subarticular foraminal stenosis. Marrow edema S2 and S3 sacral body  likely related to acute/subacute sacral insufficiency fracture. This can be further evaluated with CT sacrum.   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   Elavil at night provides benefit  She also takes Tramadol  prescribed by her PCP.   Also c/o bilateral hand numbness and tingling. She